# Patient Record
Sex: MALE | Race: WHITE | NOT HISPANIC OR LATINO | Employment: OTHER | URBAN - NONMETROPOLITAN AREA
[De-identification: names, ages, dates, MRNs, and addresses within clinical notes are randomized per-mention and may not be internally consistent; named-entity substitution may affect disease eponyms.]

---

## 2017-08-02 ENCOUNTER — HOSPITAL ENCOUNTER (EMERGENCY)
Facility: HOSPITAL | Age: 37
Discharge: HOME | End: 2017-08-02
Attending: EMERGENCY MEDICINE | Admitting: EMERGENCY MEDICINE

## 2017-08-02 VITALS
BODY MASS INDEX: 25.73 KG/M2 | SYSTOLIC BLOOD PRESSURE: 123 MMHG | TEMPERATURE: 99.6 F | DIASTOLIC BLOOD PRESSURE: 77 MMHG | WEIGHT: 190 LBS | OXYGEN SATURATION: 94 % | RESPIRATION RATE: 18 BRPM | HEIGHT: 72 IN | HEART RATE: 73 BPM

## 2017-08-02 DIAGNOSIS — T50.911A: Primary | ICD-10-CM

## 2017-08-02 LAB
ALBUMIN SERPL-MCNC: 4.5 G/DL (ref 3.5–5)
ALBUMIN/GLOB SERPL: 1.1 G/DL (ref 1–2)
ALP SERPL-CCNC: 58 U/L (ref 38–126)
ALT SERPL W P-5'-P-CCNC: 54 U/L (ref 13–69)
AMPHET+METHAMPHET UR QL: POSITIVE
AMPHETAMINES UR QL: POSITIVE
ANION GAP SERPL CALCULATED.3IONS-SCNC: 16 MMOL/L
AST SERPL-CCNC: 44 U/L (ref 15–46)
BARBITURATES UR QL SCN: NEGATIVE
BASOPHILS # BLD AUTO: 0.06 10*3/MM3 (ref 0–0.2)
BASOPHILS NFR BLD AUTO: 0.6 % (ref 0–2.5)
BENZODIAZ UR QL SCN: NEGATIVE
BILIRUB SERPL-MCNC: 1 MG/DL (ref 0.2–1.3)
BUN BLD-MCNC: 11 MG/DL (ref 7–20)
BUN/CREAT SERPL: 9.2 (ref 6.3–21.9)
BUPRENORPHINE SERPL-MCNC: NEGATIVE NG/ML
CALCIUM SPEC-SCNC: 9.1 MG/DL (ref 8.4–10.2)
CANNABINOIDS SERPL QL: POSITIVE
CHLORIDE SERPL-SCNC: 103 MMOL/L (ref 98–107)
CO2 SERPL-SCNC: 26 MMOL/L (ref 26–30)
COCAINE UR QL: NEGATIVE
CREAT BLD-MCNC: 1.2 MG/DL (ref 0.6–1.3)
DEPRECATED RDW RBC AUTO: 46.8 FL (ref 37–54)
EOSINOPHIL # BLD AUTO: 0.24 10*3/MM3 (ref 0–0.7)
EOSINOPHIL NFR BLD AUTO: 2.5 % (ref 0–7)
ERYTHROCYTE [DISTWIDTH] IN BLOOD BY AUTOMATED COUNT: 13.8 % (ref 11.5–14.5)
GFR SERPL CREATININE-BSD FRML MDRD: 68 ML/MIN/1.73
GLOBULIN UR ELPH-MCNC: 4 GM/DL
GLUCOSE BLD-MCNC: 111 MG/DL (ref 74–98)
HCT VFR BLD AUTO: 47.9 % (ref 42–52)
HGB BLD-MCNC: 16.5 G/DL (ref 14–18)
HOLD SPECIMEN: NORMAL
HOLD SPECIMEN: NORMAL
IMM GRANULOCYTES # BLD: 0.09 10*3/MM3 (ref 0–0.06)
IMM GRANULOCYTES NFR BLD: 1 % (ref 0–0.6)
LYMPHOCYTES # BLD AUTO: 5.59 10*3/MM3 (ref 0.6–3.4)
LYMPHOCYTES NFR BLD AUTO: 59.2 % (ref 10–50)
MCH RBC QN AUTO: 32.1 PG (ref 27–31)
MCHC RBC AUTO-ENTMCNC: 34.4 G/DL (ref 30–37)
MCV RBC AUTO: 93.2 FL (ref 80–94)
METHADONE UR QL SCN: NEGATIVE
MONOCYTES # BLD AUTO: 0.77 10*3/MM3 (ref 0–0.9)
MONOCYTES NFR BLD AUTO: 8.2 % (ref 0–12)
NEUTROPHILS # BLD AUTO: 2.69 10*3/MM3 (ref 2–6.9)
NEUTROPHILS NFR BLD AUTO: 28.5 % (ref 37–80)
NRBC BLD MANUAL-RTO: 0 /100 WBC (ref 0–0)
OPIATES UR QL: POSITIVE
OXYCODONE UR QL SCN: NEGATIVE
PCP UR QL SCN: NEGATIVE
PLATELET # BLD AUTO: 126 10*3/MM3 (ref 130–400)
PMV BLD AUTO: 10.5 FL (ref 6–12)
POTASSIUM BLD-SCNC: 4 MMOL/L (ref 3.5–5.1)
PROPOXYPH UR QL: NEGATIVE
PROT SERPL-MCNC: 8.5 G/DL (ref 6.3–8.2)
RBC # BLD AUTO: 5.14 10*6/MM3 (ref 4.7–6.1)
RBC MORPH BLD: NORMAL
SMALL PLATELETS BLD QL SMEAR: NORMAL
SODIUM BLD-SCNC: 141 MMOL/L (ref 137–145)
TRICYCLICS UR QL SCN: NEGATIVE
WBC MORPH BLD: NORMAL
WBC NRBC COR # BLD: 9.44 10*3/MM3 (ref 4.8–10.8)
WHOLE BLOOD HOLD SPECIMEN: NORMAL
WHOLE BLOOD HOLD SPECIMEN: NORMAL

## 2017-08-02 PROCEDURE — 80053 COMPREHEN METABOLIC PANEL: CPT | Performed by: NURSE PRACTITIONER

## 2017-08-02 PROCEDURE — 96360 HYDRATION IV INFUSION INIT: CPT

## 2017-08-02 PROCEDURE — 85025 COMPLETE CBC W/AUTO DIFF WBC: CPT | Performed by: NURSE PRACTITIONER

## 2017-08-02 PROCEDURE — 80306 DRUG TEST PRSMV INSTRMNT: CPT | Performed by: NURSE PRACTITIONER

## 2017-08-02 PROCEDURE — 93005 ELECTROCARDIOGRAM TRACING: CPT | Performed by: EMERGENCY MEDICINE

## 2017-08-02 PROCEDURE — 99284 EMERGENCY DEPT VISIT MOD MDM: CPT

## 2017-08-02 PROCEDURE — 85007 BL SMEAR W/DIFF WBC COUNT: CPT | Performed by: NURSE PRACTITIONER

## 2017-08-02 RX ORDER — SODIUM CHLORIDE 0.9 % (FLUSH) 0.9 %
10 SYRINGE (ML) INJECTION AS NEEDED
Status: DISCONTINUED | OUTPATIENT
Start: 2017-08-02 | End: 2017-08-02 | Stop reason: HOSPADM

## 2017-08-02 RX ADMIN — SODIUM CHLORIDE 1000 ML: 9 INJECTION, SOLUTION INTRAVENOUS at 15:00
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
## 2023-07-29 ENCOUNTER — APPOINTMENT (OUTPATIENT)
Dept: GENERAL RADIOLOGY | Facility: HOSPITAL | Age: 43
End: 2023-07-29
Payer: COMMERCIAL

## 2023-07-29 ENCOUNTER — HOSPITAL ENCOUNTER (EMERGENCY)
Facility: HOSPITAL | Age: 43
Discharge: OTHER INSTITUTION | End: 2023-07-29
Attending: EMERGENCY MEDICINE
Payer: COMMERCIAL

## 2023-07-29 ENCOUNTER — APPOINTMENT (OUTPATIENT)
Dept: CT IMAGING | Facility: HOSPITAL | Age: 43
End: 2023-07-29
Payer: COMMERCIAL

## 2023-07-29 VITALS
RESPIRATION RATE: 18 BRPM | SYSTOLIC BLOOD PRESSURE: 107 MMHG | DIASTOLIC BLOOD PRESSURE: 68 MMHG | TEMPERATURE: 98.8 F | BODY MASS INDEX: 29.12 KG/M2 | HEART RATE: 80 BPM | OXYGEN SATURATION: 99 % | HEIGHT: 72 IN | WEIGHT: 215 LBS

## 2023-07-29 DIAGNOSIS — J81.0 ACUTE PULMONARY EDEMA: ICD-10-CM

## 2023-07-29 DIAGNOSIS — E87.20 LACTIC ACIDOSIS: ICD-10-CM

## 2023-07-29 DIAGNOSIS — I85.01 BLEEDING ESOPHAGEAL VARICES, UNSPECIFIED ESOPHAGEAL VARICES TYPE: ICD-10-CM

## 2023-07-29 DIAGNOSIS — R60.9 PERIPHERAL EDEMA: ICD-10-CM

## 2023-07-29 DIAGNOSIS — D64.9 ANEMIA, UNSPECIFIED TYPE: Primary | ICD-10-CM

## 2023-07-29 DIAGNOSIS — F19.10 IV DRUG ABUSE: ICD-10-CM

## 2023-07-29 LAB
ABO GROUP BLD: NORMAL
ABO GROUP BLD: NORMAL
ALBUMIN SERPL-MCNC: 3.7 G/DL (ref 3.5–5.2)
ALBUMIN/GLOB SERPL: 1.2 G/DL
ALP SERPL-CCNC: 56 U/L (ref 39–117)
ALT SERPL W P-5'-P-CCNC: 42 U/L (ref 1–41)
ANION GAP SERPL CALCULATED.3IONS-SCNC: 13.5 MMOL/L (ref 5–15)
APTT PPP: 29.3 SECONDS (ref 70–100)
AST SERPL-CCNC: 40 U/L (ref 1–40)
BASOPHILS # BLD AUTO: 0.01 10*3/MM3 (ref 0–0.2)
BASOPHILS NFR BLD AUTO: 0.2 % (ref 0–1.5)
BILIRUB SERPL-MCNC: 0.3 MG/DL (ref 0–1.2)
BILIRUB UR QL STRIP: NEGATIVE
BLD GP AB SCN SERPL QL: NEGATIVE
BUN SERPL-MCNC: 15 MG/DL (ref 6–20)
BUN/CREAT SERPL: 14.3 (ref 7–25)
CALCIUM SPEC-SCNC: 8.6 MG/DL (ref 8.6–10.5)
CHLORIDE SERPL-SCNC: 103 MMOL/L (ref 98–107)
CLARITY UR: CLEAR
CO2 SERPL-SCNC: 20.5 MMOL/L (ref 22–29)
COLOR UR: YELLOW
CREAT SERPL-MCNC: 1.05 MG/DL (ref 0.76–1.27)
D-LACTATE SERPL-SCNC: 2.9 MMOL/L (ref 0.5–2)
DEPRECATED RDW RBC AUTO: 51.7 FL (ref 37–54)
EGFRCR SERPLBLD CKD-EPI 2021: 90.3 ML/MIN/1.73
EOSINOPHIL # BLD AUTO: 0.08 10*3/MM3 (ref 0–0.4)
EOSINOPHIL NFR BLD AUTO: 1.6 % (ref 0.3–6.2)
ERYTHROCYTE [DISTWIDTH] IN BLOOD BY AUTOMATED COUNT: 17.4 % (ref 12.3–15.4)
GEN 5 2HR TROPONIN T REFLEX: 19 NG/L
GLOBULIN UR ELPH-MCNC: 3.1 GM/DL
GLUCOSE SERPL-MCNC: 69 MG/DL (ref 65–99)
GLUCOSE UR STRIP-MCNC: NEGATIVE MG/DL
HCT VFR BLD AUTO: 15.1 % (ref 37.5–51)
HGB BLD-MCNC: 4.3 G/DL (ref 13–17.7)
HGB UR QL STRIP.AUTO: NEGATIVE
HOLD SPECIMEN: NORMAL
HOLD SPECIMEN: NORMAL
HYPOCHROMIA BLD QL: NORMAL
IMM GRANULOCYTES # BLD AUTO: 0.04 10*3/MM3 (ref 0–0.05)
IMM GRANULOCYTES NFR BLD AUTO: 0.8 % (ref 0–0.5)
INR PPP: 1.11 (ref 0.9–1.1)
KETONES UR QL STRIP: NEGATIVE
LEUKOCYTE ESTERASE UR QL STRIP.AUTO: NEGATIVE
LYMPHOCYTES # BLD AUTO: 1.56 10*3/MM3 (ref 0.7–3.1)
LYMPHOCYTES NFR BLD AUTO: 31.3 % (ref 19.6–45.3)
MACROCYTES BLD QL SMEAR: NORMAL
MCH RBC QN AUTO: 23.2 PG (ref 26.6–33)
MCHC RBC AUTO-ENTMCNC: 28.5 G/DL (ref 31.5–35.7)
MCV RBC AUTO: 81.6 FL (ref 79–97)
MONOCYTES # BLD AUTO: 0.6 10*3/MM3 (ref 0.1–0.9)
MONOCYTES NFR BLD AUTO: 12 % (ref 5–12)
NEUTROPHILS NFR BLD AUTO: 2.69 10*3/MM3 (ref 1.7–7)
NEUTROPHILS NFR BLD AUTO: 54.1 % (ref 42.7–76)
NITRITE UR QL STRIP: NEGATIVE
NRBC BLD AUTO-RTO: 0.4 /100 WBC (ref 0–0.2)
NT-PROBNP SERPL-MCNC: 249.1 PG/ML (ref 0–450)
PH UR STRIP.AUTO: 7 [PH] (ref 5–8)
PLAT MORPH BLD: NORMAL
PLATELET # BLD AUTO: 135 10*3/MM3 (ref 140–450)
PMV BLD AUTO: 12 FL (ref 6–12)
POTASSIUM SERPL-SCNC: 4.7 MMOL/L (ref 3.5–5.2)
PROCALCITONIN SERPL-MCNC: 0.09 NG/ML (ref 0–0.25)
PROT SERPL-MCNC: 6.8 G/DL (ref 6–8.5)
PROT UR QL STRIP: NEGATIVE
PROTHROMBIN TIME: 14.8 SECONDS (ref 12.3–15.1)
RBC # BLD AUTO: 1.85 10*6/MM3 (ref 4.14–5.8)
RH BLD: POSITIVE
RH BLD: POSITIVE
SODIUM SERPL-SCNC: 137 MMOL/L (ref 136–145)
SP GR UR STRIP: 1.02 (ref 1–1.03)
T&S EXPIRATION DATE: NORMAL
TARGETS BLD QL SMEAR: NORMAL
TROPONIN T DELTA: 1 NG/L
TROPONIN T SERPL HS-MCNC: 18 NG/L
UROBILINOGEN UR QL STRIP: NORMAL
WBC MORPH BLD: NORMAL
WBC NRBC COR # BLD: 4.98 10*3/MM3 (ref 3.4–10.8)
WHOLE BLOOD HOLD COAG: NORMAL
WHOLE BLOOD HOLD SPECIMEN: NORMAL

## 2023-07-29 PROCEDURE — 87040 BLOOD CULTURE FOR BACTERIA: CPT | Performed by: EMERGENCY MEDICINE

## 2023-07-29 PROCEDURE — 81003 URINALYSIS AUTO W/O SCOPE: CPT | Performed by: EMERGENCY MEDICINE

## 2023-07-29 PROCEDURE — 86900 BLOOD TYPING SEROLOGIC ABO: CPT

## 2023-07-29 PROCEDURE — 85007 BL SMEAR W/DIFF WBC COUNT: CPT | Performed by: EMERGENCY MEDICINE

## 2023-07-29 PROCEDURE — 84145 PROCALCITONIN (PCT): CPT | Performed by: EMERGENCY MEDICINE

## 2023-07-29 PROCEDURE — 74177 CT ABD & PELVIS W/CONTRAST: CPT

## 2023-07-29 PROCEDURE — P9016 RBC LEUKOCYTES REDUCED: HCPCS

## 2023-07-29 PROCEDURE — 99285 EMERGENCY DEPT VISIT HI MDM: CPT

## 2023-07-29 PROCEDURE — 84484 ASSAY OF TROPONIN QUANT: CPT | Performed by: EMERGENCY MEDICINE

## 2023-07-29 PROCEDURE — 86920 COMPATIBILITY TEST SPIN: CPT

## 2023-07-29 PROCEDURE — 85025 COMPLETE CBC W/AUTO DIFF WBC: CPT | Performed by: EMERGENCY MEDICINE

## 2023-07-29 PROCEDURE — 96376 TX/PRO/DX INJ SAME DRUG ADON: CPT

## 2023-07-29 PROCEDURE — 85610 PROTHROMBIN TIME: CPT | Performed by: EMERGENCY MEDICINE

## 2023-07-29 PROCEDURE — 36430 TRANSFUSION BLD/BLD COMPNT: CPT

## 2023-07-29 PROCEDURE — 83880 ASSAY OF NATRIURETIC PEPTIDE: CPT | Performed by: EMERGENCY MEDICINE

## 2023-07-29 PROCEDURE — 86901 BLOOD TYPING SEROLOGIC RH(D): CPT

## 2023-07-29 PROCEDURE — 96368 THER/DIAG CONCURRENT INF: CPT

## 2023-07-29 PROCEDURE — 96366 THER/PROPH/DIAG IV INF ADDON: CPT

## 2023-07-29 PROCEDURE — 71275 CT ANGIOGRAPHY CHEST: CPT

## 2023-07-29 PROCEDURE — 85730 THROMBOPLASTIN TIME PARTIAL: CPT | Performed by: EMERGENCY MEDICINE

## 2023-07-29 PROCEDURE — 83605 ASSAY OF LACTIC ACID: CPT | Performed by: EMERGENCY MEDICINE

## 2023-07-29 PROCEDURE — 25010000002 VANCOMYCIN 5 G RECONSTITUTED SOLUTION: Performed by: EMERGENCY MEDICINE

## 2023-07-29 PROCEDURE — 25510000001 IOPAMIDOL 61 % SOLUTION: Performed by: EMERGENCY MEDICINE

## 2023-07-29 PROCEDURE — 96365 THER/PROPH/DIAG IV INF INIT: CPT

## 2023-07-29 PROCEDURE — 80053 COMPREHEN METABOLIC PANEL: CPT | Performed by: EMERGENCY MEDICINE

## 2023-07-29 PROCEDURE — 25010000002 OCTREOTIDE PER 25 MCG: Performed by: EMERGENCY MEDICINE

## 2023-07-29 PROCEDURE — 71045 X-RAY EXAM CHEST 1 VIEW: CPT

## 2023-07-29 PROCEDURE — 86901 BLOOD TYPING SEROLOGIC RH(D): CPT | Performed by: EMERGENCY MEDICINE

## 2023-07-29 PROCEDURE — 36415 COLL VENOUS BLD VENIPUNCTURE: CPT

## 2023-07-29 PROCEDURE — 86850 RBC ANTIBODY SCREEN: CPT | Performed by: EMERGENCY MEDICINE

## 2023-07-29 PROCEDURE — 86900 BLOOD TYPING SEROLOGIC ABO: CPT | Performed by: EMERGENCY MEDICINE

## 2023-07-29 PROCEDURE — 25010000002 CEFTRIAXONE SODIUM-DEXTROSE 1000-3.74 MG-%(50ML) RECONSTITUTED SOLUTION: Performed by: EMERGENCY MEDICINE

## 2023-07-29 RX ORDER — ACETAMINOPHEN 500 MG
1000 TABLET ORAL EVERY 6 HOURS PRN
Status: DISCONTINUED | OUTPATIENT
Start: 2023-07-29 | End: 2023-07-29 | Stop reason: HOSPADM

## 2023-07-29 RX ORDER — ASPIRIN 325 MG
325 TABLET ORAL ONCE
Status: COMPLETED | OUTPATIENT
Start: 2023-07-29 | End: 2023-07-29

## 2023-07-29 RX ORDER — SODIUM CHLORIDE 0.9 % (FLUSH) 0.9 %
10 SYRINGE (ML) INJECTION AS NEEDED
Status: DISCONTINUED | OUTPATIENT
Start: 2023-07-29 | End: 2023-07-29 | Stop reason: HOSPADM

## 2023-07-29 RX ORDER — VANCOMYCIN 2 GRAM/500 ML IN 0.9 % SODIUM CHLORIDE INTRAVENOUS
20 ONCE
Status: COMPLETED | OUTPATIENT
Start: 2023-07-29 | End: 2023-07-29

## 2023-07-29 RX ORDER — CEFTRIAXONE 1 G/50ML
1000 INJECTION, SOLUTION INTRAVENOUS ONCE
Status: COMPLETED | OUTPATIENT
Start: 2023-07-29 | End: 2023-07-29

## 2023-07-29 RX ORDER — FUROSEMIDE 10 MG/ML
40 INJECTION INTRAMUSCULAR; INTRAVENOUS
Status: DISCONTINUED | OUTPATIENT
Start: 2023-07-29 | End: 2023-07-29 | Stop reason: HOSPADM

## 2023-07-29 RX ORDER — PANTOPRAZOLE SODIUM 40 MG/10ML
80 INJECTION, POWDER, LYOPHILIZED, FOR SOLUTION INTRAVENOUS ONCE
Status: COMPLETED | OUTPATIENT
Start: 2023-07-29 | End: 2023-07-29

## 2023-07-29 RX ORDER — OCTREOTIDE ACETATE 100 UG/ML
50 INJECTION, SOLUTION INTRAVENOUS; SUBCUTANEOUS ONCE
Status: COMPLETED | OUTPATIENT
Start: 2023-07-29 | End: 2023-07-29

## 2023-07-29 RX ADMIN — PANTOPRAZOLE SODIUM 8 MG/HR: 40 INJECTION, POWDER, FOR SOLUTION INTRAVENOUS at 12:06

## 2023-07-29 RX ADMIN — PANTOPRAZOLE SODIUM 80 MG: 40 INJECTION, POWDER, FOR SOLUTION INTRAVENOUS at 12:05

## 2023-07-29 RX ADMIN — IOPAMIDOL 100 ML: 612 INJECTION, SOLUTION INTRAVENOUS at 11:12

## 2023-07-29 RX ADMIN — OCTREOTIDE ACETATE 50 MCG: 100 INJECTION, SOLUTION INTRAVENOUS; SUBCUTANEOUS at 12:06

## 2023-07-29 RX ADMIN — ACETAMINOPHEN 1000 MG: 500 TABLET, FILM COATED ORAL at 13:39

## 2023-07-29 RX ADMIN — OCTREOTIDE ACETATE 25 MCG/HR: 200 INJECTION, SOLUTION INTRAVENOUS; SUBCUTANEOUS at 12:06

## 2023-07-29 RX ADMIN — VANCOMYCIN HYDROCHLORIDE 2000 MG: 500 INJECTION, POWDER, LYOPHILIZED, FOR SOLUTION INTRAVENOUS at 12:30

## 2023-07-29 RX ADMIN — ASPIRIN 325 MG: 325 TABLET ORAL at 10:16

## 2023-07-29 RX ADMIN — CEFTRIAXONE 1000 MG: 1 INJECTION, SOLUTION INTRAVENOUS at 12:05

## 2023-07-29 NOTE — ED NOTES
Called [facility] at this time requesting hospitalist per MD Casimiro request at this time. [facility] stated that they would have to plavce the pt on a wait list because they have no beds available at this time, but would have hospitalist call us back. Casimiro noticed.

## 2023-07-29 NOTE — ED NOTES
Called [facility] at this time per MD Casimiro request. [facility] stated they would call back. Request was made to Radiology to power share any imaging to [facility]. MD Casimiro notified.

## 2023-07-29 NOTE — ED NOTES
Called [facility] at this time requesting non emergent transport to [facility] ED.

## 2023-07-29 NOTE — ED PROVIDER NOTES
"  HPI: Nilson Miller is a 42 y.o. male who presents to the emergency department complaining of shortness of breath and swelling.  He states that for the last week or so he has had increasing swelling to his lower extremities and abdomen.  He is also felt increasingly short of breath.  His significant other has noticed that he \"pale and green\".  He has had some subjective fevers and chills.  Had 1 episode of diarrhea this morning.  He denies any severe abdominal pain, vomiting, chest pain.  Patient does endorse using IV methamphetamines.      REVIEW OF SYSTEMS: All other systems reviewed and are negative     PAST MEDICAL HISTORY:   History reviewed. No pertinent past medical history.     FAMILY HISTORY:   History reviewed. No pertinent family history.     SOCIAL HISTORY:   Social History     Socioeconomic History    Marital status: Legally    Tobacco Use    Smoking status: Every Day     Packs/day: 0.50     Types: Cigarettes   Substance and Sexual Activity    Alcohol use: Yes     Comment: \"occasionally\"    Drug use: Yes     Types: Methamphetamines, Marijuana        SURGICAL HISTORY:   Past Surgical History:   Procedure Laterality Date    KNEE RECONSTRUCTION, MEDIAL PATELLAR FEMORAL LIGAMENT          ALLERGIES: Penicillins and Sulfa antibiotics       PHYSICAL EXAM:   VITAL SIGNS:   Vitals:    07/29/23 1252   BP: 98/64   Pulse: 81   Resp: 18   Temp: 98.8 °F (37.1 °C)   SpO2: 98%      CONSTITUTIONAL: Awake, ill-appearing  HENT: Atraumatic, normocephalic, oral mucosa moist, airway patent. Nares patent without drainage. External ears normal.   EYES: EOMI, PERRL   NECK: Trachea midline, nontender, supple   CARDIOVASCULAR: Tachycardia, murmur heard  PULMONARY/CHEST: Normal work of breathing. Clear to auscultation, no rhonchi, wheezes, or rales.  ABDOMINAL: Soft, mild diffuse tenderness, mild distention, no rebound or guarding.  NEUROLOGIC: Nonfocal, moves all four extremities, no gross sensory or motor deficits. "   EXTREMITIES: Pitting edema to the thighs bilaterally  SKIN: Warm, Dry, pale, jaundiced      ED COURSE / MEDICAL DECISION MAKING:     Nilson Miller is a 42 y.o. male who presents to the emergency department for evaluation of shortness of breath, swelling.  Ill-appearing young man with exam as above.  His vital signs are notable for tachycardia.  His oxygen saturation is normal on room air when they are present.  His exam is notable for new heart murmur, pitting edema to the bilateral lower extremities and pale possibly jaundiced skin.  We will start with labs and imaging.  Disposition pending though anticipate admission.    Differential diagnosis includes endocarditis, CHF, pneumonia, liver failure, anemia, UTI among other etiologies.    EKG interpreted by me: Sinus rhythm, normal rate, no acute ST/T changes, this is a normal EKG    1209  Initial labs notable for marked anemia, mild lactic acidosis.  BNP is normal.  Imaging reveals some mild pulmonary edema, distal esophageal thickening and varices, cirrhosis with portal hypertension.  Also small amount of ascites.  Patient was ordered transfusion, Protonix, octreotide, Rocephin and vancomycin.  We have no GI coverage here.  Discussed with Dr. Vitale at [facility], patient placed on wait list.  Remains hemodynamically stable.    1255  Discussed with Dr. Roman at the  transfer center, patient has been accepted to the [facility] ER.  Discussed with patient and significant other, they are agreeable.    45 minutes of critical care provided. This time excludes other billable procedures. Time does include preparation of documents, medical consultations, review of old records, and direct bedside care. Patient is at high risk for life-threatening deterioration due to severe anemia, lactic acidosis, pulmonary edema.       Final diagnoses:   Anemia, unspecified type   Bleeding esophageal varices, unspecified esophageal varices type   Lactic acidosis    IV drug abuse   Acute pulmonary edema   Peripheral edema        Danielito Kirkpatrick MD  07/29/23 6988

## 2023-07-30 LAB
BH BB BLOOD EXPIRATION DATE: NORMAL
BH BB BLOOD TYPE BARCODE: 6200
BH BB DISPENSE STATUS: NORMAL
BH BB PRODUCT CODE: NORMAL
BH BB UNIT NUMBER: NORMAL
CROSSMATCH INTERPRETATION: NORMAL
UNIT  ABO: NORMAL
UNIT  RH: NORMAL

## 2023-08-03 LAB
BACTERIA SPEC AEROBE CULT: NORMAL
BACTERIA SPEC AEROBE CULT: NORMAL
BH BB BLOOD EXPIRATION DATE: NORMAL
BH BB BLOOD TYPE BARCODE: 6200
BH BB DISPENSE STATUS: NORMAL
BH BB PRODUCT CODE: NORMAL
BH BB UNIT NUMBER: NORMAL
CROSSMATCH INTERPRETATION: NORMAL
UNIT  ABO: NORMAL
UNIT  RH: NORMAL

## 2023-08-14 ENCOUNTER — TRANSCRIBE ORDERS (OUTPATIENT)
Dept: ADMINISTRATIVE | Facility: HOSPITAL | Age: 43
End: 2023-08-14
Payer: COMMERCIAL

## 2023-08-14 DIAGNOSIS — I89.0 LYMPHEDEMA: Primary | ICD-10-CM

## 2023-09-18 ENCOUNTER — HOSPITAL ENCOUNTER (OUTPATIENT)
Dept: CT IMAGING | Facility: HOSPITAL | Age: 43
Discharge: HOME | End: 2023-09-18
Payer: COMMERCIAL

## 2023-09-18 DIAGNOSIS — I89.0 LYMPHEDEMA: ICD-10-CM

## 2023-12-07 ENCOUNTER — TRANSCRIBE ORDERS (OUTPATIENT)
Dept: LAB | Facility: HOSPITAL | Age: 43
End: 2023-12-07
Payer: COMMERCIAL

## 2023-12-07 DIAGNOSIS — B18.2 CHRONIC HEPATITIS C WITHOUT HEPATIC COMA: ICD-10-CM

## 2023-12-11 ENCOUNTER — LAB (OUTPATIENT)
Dept: LAB | Facility: HOSPITAL | Age: 43
End: 2023-12-11
Payer: COMMERCIAL

## 2023-12-11 ENCOUNTER — OFFICE VISIT (OUTPATIENT)
Dept: PSYCHIATRY | Facility: CLINIC | Age: 43
End: 2023-12-11
Payer: COMMERCIAL

## 2023-12-11 VITALS
HEART RATE: 78 BPM | OXYGEN SATURATION: 98 % | WEIGHT: 213 LBS | DIASTOLIC BLOOD PRESSURE: 92 MMHG | SYSTOLIC BLOOD PRESSURE: 160 MMHG | HEIGHT: 72 IN | BODY MASS INDEX: 28.85 KG/M2

## 2023-12-11 DIAGNOSIS — F15.20 METHAMPHETAMINE USE DISORDER, SEVERE, DEPENDENCE: Primary | ICD-10-CM

## 2023-12-11 DIAGNOSIS — F10.21 ALCOHOL USE DISORDER, SEVERE, IN SUSTAINED REMISSION: ICD-10-CM

## 2023-12-11 DIAGNOSIS — F15.20 METHAMPHETAMINE USE DISORDER, SEVERE, DEPENDENCE: ICD-10-CM

## 2023-12-11 DIAGNOSIS — B18.2 CHRONIC HEPATITIS C WITHOUT HEPATIC COMA: ICD-10-CM

## 2023-12-11 DIAGNOSIS — F11.21 OPIOID USE DISORDER, SEVERE, IN SUSTAINED REMISSION: ICD-10-CM

## 2023-12-11 DIAGNOSIS — F12.20 CANNABIS USE DISORDER, SEVERE, DEPENDENCE: ICD-10-CM

## 2023-12-11 LAB
ALBUMIN SERPL-MCNC: 4.5 G/DL (ref 3.5–5.2)
ALBUMIN/GLOB SERPL: 1.4 G/DL
ALP SERPL-CCNC: 59 U/L (ref 39–117)
ALT SERPL W P-5'-P-CCNC: 34 U/L (ref 1–41)
ANION GAP SERPL CALCULATED.3IONS-SCNC: 15 MMOL/L (ref 5–15)
AST SERPL-CCNC: 34 U/L (ref 1–40)
BASOPHILS # BLD AUTO: 0.03 10*3/MM3 (ref 0–0.2)
BASOPHILS NFR BLD AUTO: 0.8 % (ref 0–1.5)
BILIRUB SERPL-MCNC: 0.3 MG/DL (ref 0–1.2)
BUN SERPL-MCNC: 11 MG/DL (ref 6–20)
BUN/CREAT SERPL: 10.6 (ref 7–25)
CALCIUM SPEC-SCNC: 9.1 MG/DL (ref 8.6–10.5)
CHLORIDE SERPL-SCNC: 100 MMOL/L (ref 98–107)
CO2 SERPL-SCNC: 25 MMOL/L (ref 22–29)
CREAT SERPL-MCNC: 1.04 MG/DL (ref 0.76–1.27)
DEPRECATED RDW RBC AUTO: 52.9 FL (ref 37–54)
EGFRCR SERPLBLD CKD-EPI 2021: 90.8 ML/MIN/1.73
EOSINOPHIL # BLD AUTO: 0.16 10*3/MM3 (ref 0–0.4)
EOSINOPHIL NFR BLD AUTO: 4.3 % (ref 0.3–6.2)
ERYTHROCYTE [DISTWIDTH] IN BLOOD BY AUTOMATED COUNT: 19.4 % (ref 12.3–15.4)
GLOBULIN UR ELPH-MCNC: 3.3 GM/DL
GLUCOSE SERPL-MCNC: 115 MG/DL (ref 65–99)
HCT VFR BLD AUTO: 36.4 % (ref 37.5–51)
HGB BLD-MCNC: 11.4 G/DL (ref 13–17.7)
INR PPP: 0.96 (ref 0.9–1.1)
LYMPHOCYTES # BLD AUTO: 1.36 10*3/MM3 (ref 0.7–3.1)
LYMPHOCYTES NFR BLD AUTO: 36.5 % (ref 19.6–45.3)
MCH RBC QN AUTO: 24.7 PG (ref 26.6–33)
MCHC RBC AUTO-ENTMCNC: 31.3 G/DL (ref 31.5–35.7)
MCV RBC AUTO: 78.8 FL (ref 79–97)
MONOCYTES # BLD AUTO: 0.39 10*3/MM3 (ref 0.1–0.9)
MONOCYTES NFR BLD AUTO: 10.5 % (ref 5–12)
NEUTROPHILS NFR BLD AUTO: 1.79 10*3/MM3 (ref 1.7–7)
NEUTROPHILS NFR BLD AUTO: 47.9 % (ref 42.7–76)
PLATELET # BLD AUTO: 76 10*3/MM3 (ref 140–450)
POTASSIUM SERPL-SCNC: 4 MMOL/L (ref 3.5–5.2)
PROT SERPL-MCNC: 7.8 G/DL (ref 6–8.5)
PROTHROMBIN TIME: 13.3 SECONDS (ref 12.3–15.1)
RBC # BLD AUTO: 4.62 10*6/MM3 (ref 4.14–5.8)
SODIUM SERPL-SCNC: 140 MMOL/L (ref 136–145)
WBC NRBC COR # BLD AUTO: 3.73 10*3/MM3 (ref 3.4–10.8)

## 2023-12-11 PROCEDURE — 1160F RVW MEDS BY RX/DR IN RCRD: CPT | Performed by: NURSE PRACTITIONER

## 2023-12-11 PROCEDURE — 85025 COMPLETE CBC W/AUTO DIFF WBC: CPT

## 2023-12-11 PROCEDURE — 90792 PSYCH DIAG EVAL W/MED SRVCS: CPT | Performed by: NURSE PRACTITIONER

## 2023-12-11 PROCEDURE — 85610 PROTHROMBIN TIME: CPT

## 2023-12-11 PROCEDURE — 80053 COMPREHEN METABOLIC PANEL: CPT

## 2023-12-11 PROCEDURE — 36415 COLL VENOUS BLD VENIPUNCTURE: CPT

## 2023-12-11 PROCEDURE — 87522 HEPATITIS C REVRS TRNSCRPJ: CPT

## 2023-12-11 PROCEDURE — 1159F MED LIST DOCD IN RCRD: CPT | Performed by: NURSE PRACTITIONER

## 2023-12-11 RX ORDER — FUROSEMIDE 20 MG/1
20 TABLET ORAL DAILY
COMMUNITY
Start: 2023-08-10

## 2023-12-11 RX ORDER — PANTOPRAZOLE SODIUM 40 MG/1
40 TABLET, DELAYED RELEASE ORAL DAILY
COMMUNITY
Start: 2023-08-03 | End: 2024-08-02

## 2023-12-11 RX ORDER — OMEPRAZOLE 20 MG/1
20 CAPSULE, DELAYED RELEASE ORAL DAILY
COMMUNITY
Start: 2023-11-08

## 2023-12-11 RX ORDER — SPIRONOLACTONE 50 MG/1
50 TABLET, FILM COATED ORAL DAILY
COMMUNITY
Start: 2023-08-10

## 2023-12-11 RX ORDER — FERROUS SULFATE 324(65)MG
324 TABLET, DELAYED RELEASE (ENTERIC COATED) ORAL
COMMUNITY
Start: 2023-08-03

## 2023-12-11 RX ORDER — VELPATASVIR AND SOFOSBUVIR 100; 400 MG/1; MG/1
1 TABLET, FILM COATED ORAL DAILY
COMMUNITY
Start: 2023-11-03

## 2023-12-11 RX ORDER — LANOLIN ALCOHOL/MO/W.PET/CERES
CREAM (GRAM) TOPICAL
COMMUNITY

## 2023-12-11 NOTE — PROGRESS NOTES
New Patient Office Visit        Patient Name: Nilson Miller  : 1980   MRN: [id number]     Referring Provider: Kylah Saenz APRN    Chief Complaint: Substance use    History of Present Illness:   Nilson Miller is a 43 y.o. male who is here today for initial evaluation with provider related to substance use. Initial substance at age 14 with marijuana use.  Use was social a onset but increased over time.  At peak was smoking 4-5 joints per day in his 20s.  Currently smoking about 1 joint nightly with last intake 2023.  At age 16 use of alcohol began and used regularly for quite some time. Peak was about 20 beers per day with last use 10 years ago.  Around age 23 use of opioids and methamphetamines began.  Use of both escalated over time.  At peak was using multiple Percocet 30s daily.  Around age [age] transition to Suboxone and had no further Percocet intake.  Began using heroin briefly in 2019.  Use was sporadic but resulted in 5 or 6 overdoses.  No intake since 2019.  Methamphetamine use was regular for 3 to 4 years.  Got a job at the [employer] and stopped for a few years.  Restarted in  and has been using about 1/4 g daily.  Last intake 2023.    Cravings for marijuana and methamphetamine are intermittent and typically triggered by fatigue, habit. Previous [facility] treatment includes SAP while incarcerated.  Identifies sober support system of his aunt and girlfriend. Motivated to change for his health and new baby.     Denies psych history. Today reports symptoms of situational anxiety but feels he is coping well overall.  No previous pharmacological intervention.      PMH includes hepatitis C, cirrhosis, and esophageal varices.  Currently being treated for hep C by .  Has upcoming appointment with [facility] for banding.  Currently does not have a PCP.  Denies seizure history.    Currently lives in [place] with his aunt.  Aunt and girlfriend are sober and supportive.   Has 3 children (ages 23, 19, 1 month) . Currently employed full-time by [employer]. License is not active and and helps with transportation. Current legal issues include possession of methamphetamine and marijuana from 11/29/2023.  Multiple previous felonies.  Charges are out of [facility].  is [place]. Next court date is 1/3/2024.    Triggers: Fatigue, habit, seeing needles in the past    Cravings: Intermittent    Relapse Prevention: IOP screener scheduled, TCM, peer support, recovery meetings encouraged    Urine Drug Screen (today's visit) discussed: Ordered    UDS Confirmation: N/A    MAXWELL (PDMP) Reviewed for Current/Active Medications: No active medications    DSM 5 Substance Use Disorder Checklist     Diagnostic Criteria   (Substance use disorder requires at least 2 criteria be met within 12 month period)   Meets Criteria          Yes / No  Notes/Supporting Information    Substance often taken in larger amounts or over a longer period than intended.  yes Alcohol, marijuana, methamphetamine, opioids   2.  There is a persistent desire or unsuccessful effort to cut        down or control th substance use.  yes marijuana, methamphetamine, opioids   3.  A great deal of time is spent in activities necessary to        obtain the substance, use the substance, or recover        from its effects.  yes marijuana, methamphetamine, opioids   4.  Craving or a strong desire to use the substance.  yes Alcohol, marijuana, methamphetamine, opioids   5.  Recurrent substance use resulting in failure to fulfill        major role obligations at work, school, or home.  no    6.  Continued substance use despite having persistent or         recurrent social or interpersonal problems caused or         exacerbated by the effects of the substance.  yes Alcohol, opioids   7.   Important social, occupational or recreational activities        are given up or reduced because of substance use.  yes Alcohol,  marijuana, methamphetamine, opioids   8.   Recurrent substance use in situations in which it is        physically hazardous.  yes Alcohol, methamphetamine, opioids   9.  Continued use despite knowledge of having a         persistent or recurrent physical or psychological         problem that is likely to have been caused or        exacerbated by the substance.  yes Alcohol, methamphetamine, opioids   10. * Tolerance, as defined by either of the following:          a. A need for markedly increased amounts of the              Substance to achieve intoxication or desired effect.          b. Markedly diminished effect with continued use of              The same substance.  yes Alcohol, marijuana, methamphetamine, opioids   11.  * Withdrawal, as manifested by either of the following:         a. The characteristic withdrawal for the substance.          b. The same (or closely related) substance is taken to               Relieve or avoid withdrawal symptoms.  yes Alcohol, opioids   **This criterion is not considered to be met for those individuals taking prescriptions opiates solely under medical supervision. **   Severity: Mild: 2-3 symptoms, Moderate: 4-5 symptoms, Severe: 6 or more symptoms.      Alcohol 9 of 11, marijuana 6 of 11, methamphetamine 8 of 11, opioids 10 of 11    Past Surgical History:  Past Surgical History:   Procedure Laterality Date    KNEE RECONSTRUCTION, MEDIAL PATELLAR FEMORAL LIGAMENT         Problem List:  There is no problem list on file for this patient.      Allergy:   Allergies   Allergen Reactions    Penicillins Hives    Sulfa Antibiotics Hives        Current Medications:   Current Outpatient Medications   Medication Sig Dispense Refill    ferrous sulfate 324 (65 Fe) MG tablet delayed-release EC tablet Take 1 tablet by mouth Every Other Day.      furosemide (LASIX) 20 MG tablet Take 1 tablet by mouth Daily.      Magnesium Oxide -Mg Supplement 400 (240 Mg) MG tablet TAKE ONE TABLET BY MOUTH 2  "TIMES DAILY      omeprazole (priLOSEC) 20 MG capsule Take 1 capsule by mouth Daily.      pantoprazole (PROTONIX) 40 MG EC tablet Take 1 tablet by mouth Daily.      Sofosbuvir-Velpatasvir 400-100 MG tablet Take 1 tablet by mouth Daily.      spironolactone (ALDACTONE) 50 MG tablet Take 1 tablet by mouth Daily.       No current facility-administered medications for this visit.       Past Medical History:  History reviewed. No pertinent past medical history.    Social History:  Social History     Socioeconomic History    Marital status: Legally    Tobacco Use    Smoking status: Every Day     Packs/day: .25     Types: Cigarettes     Passive exposure: Current    Smokeless tobacco: Never   Vaping Use    Vaping Use: Never used   Substance and Sexual Activity    Alcohol use: Not Currently     Comment: \"occasionally\"    Drug use: Yes     Types: Methamphetamines, Marijuana    Sexual activity: Defer       Family History:  History reviewed. No pertinent family history.      Subjective      Review of Systems:   Review of Systems   Constitutional:  Positive for fatigue. Negative for chills and fever.   Respiratory:  Negative for shortness of breath.    Cardiovascular:  Negative for chest pain.   Gastrointestinal:  Negative for abdominal pain.   Skin:  Negative for skin lesions.   Neurological:  Negative for seizures and confusion.   Psychiatric/Behavioral:  Positive for stress. Negative for hallucinations, sleep disturbance, suicidal ideas and depressed mood. The patient is not nervous/anxious.        PHQ-9 Depression Screening  Little interest or pleasure in doing things? 0-->not at all   Feeling down, depressed, or hopeless? 0-->not at all   Trouble falling or staying asleep, or sleeping too much? 0-->not at all   Feeling tired or having little energy? 0-->not at all   Poor appetite or overeating? 0-->not at all   Feeling bad about yourself - or that you are a failure or have let yourself or your family down? 0-->not at " "all   Trouble concentrating on things, such as reading the newspaper or watching television? 0-->not at all   Moving or speaking so slowly that other people could have noticed? Or the opposite - being so fidgety or restless that you have been moving around a lot more than usual? 0-->not at all   Thoughts that you would be better off dead, or of hurting yourself in some way? 0-->not at all   PHQ-9 Total Score 0   If you checked off any problems, how difficult have these problems made it for you to do your work, take care of things at home, or get along with other people? not difficult at all       RIN-7 Score:   Feeling nervous, anxious or on edge: Not at all  Not being able to stop or control worrying: Not at all  Worrying too much about different things: Not at all  Trouble Relaxing: Not at all  Being so restless that it is hard to sit still: Not at all  Feeling afraid as if something awful might happen: Not at all  Becoming easily annoyed or irritable: Not at all  RIN 7 Total Score: 0  If you checked any problems, how difficult have these problems made it for you to do your work, take care of things at home, or get along with other people: Not difficult at all    Patient History:   The following portions of the patient's history were reviewed and updated as appropriate: allergies, current medications, past family history, past medical history, past social history, past surgical history and problem list.     Social:  Social History     Socioeconomic History    Marital status: Legally    Tobacco Use    Smoking status: Every Day     Packs/day: .25     Types: Cigarettes     Passive exposure: Current    Smokeless tobacco: Never   Vaping Use    Vaping Use: Never used   Substance and Sexual Activity    Alcohol use: Not Currently     Comment: \"occasionally\"    Drug use: Yes     Types: Methamphetamines, Marijuana    Sexual activity: Defer       Medications:     Current Outpatient Medications:     ferrous sulfate " "324 (65 Fe) MG tablet delayed-release EC tablet, Take 1 tablet by mouth Every Other Day., Disp: , Rfl:     furosemide (LASIX) 20 MG tablet, Take 1 tablet by mouth Daily., Disp: , Rfl:     Magnesium Oxide -Mg Supplement 400 (240 Mg) MG tablet, TAKE ONE TABLET BY MOUTH 2 TIMES DAILY, Disp: , Rfl:     omeprazole (priLOSEC) 20 MG capsule, Take 1 capsule by mouth Daily., Disp: , Rfl:     pantoprazole (PROTONIX) 40 MG EC tablet, Take 1 tablet by mouth Daily., Disp: , Rfl:     Sofosbuvir-Velpatasvir 400-100 MG tablet, Take 1 tablet by mouth Daily., Disp: , Rfl:     spironolactone (ALDACTONE) 50 MG tablet, Take 1 tablet by mouth Daily., Disp: , Rfl:     Objective     Physical Exam  Vitals reviewed.   Constitutional:       General: He is not in acute distress.     Appearance: He is well-developed. He is not ill-appearing.   Pulmonary:      Effort: No respiratory distress.   Neurological:      Mental Status: He is alert and oriented to person, place, and time.      Gait: Gait normal.   Psychiatric:         Attention and Perception: Attention normal.         Mood and Affect: Mood and affect normal.         Speech: Speech normal.         Behavior: Behavior normal. Behavior is cooperative.         Thought Content: Thought content is not paranoid or delusional. Thought content does not include homicidal or suicidal ideation. Thought content does not include homicidal or suicidal plan.         Cognition and Memory: Cognition and memory normal.         Vital Signs:   Vitals:    12/11/23 0904   BP: 160/92   Pulse: 78   SpO2: 98%   Weight: 96.6 kg (213 lb)   Height: 182.9 cm (72\")     Body mass index is 28.89 kg/m².     Mental Status Exam:   Hygiene:   good  Cooperation:  Cooperative  Eye Contact:  Good  Psychomotor Behavior:  Appropriate  Affect:  Full range  Mood: normal  Speech:  Normal  Thought Process:  Goal directed  Thought Content:  Normal  Suicidal:  None  Homicidal:  None  Hallucinations:  None  Delusion:  None  Memory:  " Intact  Orientation:  Person, Place, Time, and Situation  Reliability:  good  Insight:  Good  Judgement:  Fair  Impulse Control:  Fair    Assessment / Plan      -This is my initial appointment with Nilson.  Based on his history today IOP recommended and screener scheduled.  Discussed off-label MAT options for methamphetamine cravings.  He feels like he is currently doing well overall and declines any intervention.  He is welcome to return at any time for this.  -Advisement to take part in and remain active in 12 Step Recovery Meetings, IOP, and/or 1:1 therapy/counseling and to establish/maintain an active relationship with a recovery sponsor.  Recovery meeting list for [facility] given.  -Continued monitoring for illicit substances for patient safety, medication compliance and future care  -JULIANA signed for [facility] courts  -JULIANA signed for [facility] and referred for peer support, TCM  -Needs PCP and contact info for Dr. Jaime colbert  -Declines Narcan sample.  Has some at home and OD education previously provided.    Assessment & Plan   Problems Addressed this Visit    None  Visit Diagnoses       Methamphetamine use disorder, severe, dependence    -  Primary    Relevant Orders    Urine Drug Screen - Supervised - Urine, Clean Catch    [facility] Tox - Urine, Clean Catch    Cannabis use disorder, severe, dependence        Opioid use disorder, severe, in sustained remission        Alcohol use disorder, severe, in sustained remission              Diagnoses         Codes Comments    Methamphetamine use disorder, severe, dependence    -  Primary ICD-10-CM: F15.20  ICD-9-CM: 304.40     Cannabis use disorder, severe, dependence     ICD-10-CM: F12.20  ICD-9-CM: 304.30     Opioid use disorder, severe, in sustained remission     ICD-10-CM: F11.21  ICD-9-CM: 304.03     Alcohol use disorder, severe, in sustained remission     ICD-10-CM: F10.21  ICD-9-CM: 303.93             Visit Diagnoses:     ICD-10-CM ICD-9-CM   1. Methamphetamine use disorder, severe, dependence  F15.20 304.40   2. Cannabis use disorder, severe, dependence  F12.20 304.30   3. Opioid use disorder, severe, in sustained remission  F11.21 304.03   4. Alcohol use disorder, severe, in sustained remission  F10.21 303.93       PLAN:  Safety: No acute safety concerns  Risk Assessment: Risk of self-harm acutely is low. Risk of self-harm chronically is also low, but could be further elevated in the event of treatment noncompliance and/or AODA.    TREATMENT PLAN: Continue supportive psychotherapy efforts and medications as indicated. Treatment and medication options discussed during today's visit. Patient acknowledged and verbally consented to continue with current treatment plan and was educated on the importance of compliance with treatment and follow-up appointments.    GOALS:  Short Term Goals: Patient will be compliant with medication, and patient will have no significant medication related side effects.  Patient will be engaged in psychotherapy as indicated.  Patient will report subjective improvement of symptoms.  Long term goals: To stabilize mood and treat/improve subjective symptoms, the patient will stay out of the hospital, the patient will be at an optimal level of functioning, and the patient will take all medications as prescribed.  The patient/guardian verbalized understanding and agreement with goals that were mutually set.    MEDICATION ISSUES:  MAXWELL reviewed as expected.  Discussed medication options and treatment plan of prescribed medication as well as the risks, benefits, and side effects including potential falls, possible impaired driving and metabolic adversities among others. Patient is agreeable to call the office with any worsening of symptoms or onset of side effects. Patient is agreeable to call 911 or go to the nearest ER should he/she begin having SI/HI. No medication side effects or related complaints today.        TOBACCO USE:  Current every day smoker less than 3 minutes spent counseling Will try to cut down    I advised Nilson Miller of the risks of tobacco use.     MEDS ORDERED DURING VISIT:  No orders of the defined types were placed in this encounter.      Return if symptoms worsen or fail to improve.                 This document has been electronically signed by GARDENIA Casas  December 11, 2023 10:29 EST      Part of this note may be an electronic transcription/translation of spoken language to printed text using the Dragon Dictation System.

## 2023-12-13 LAB
HCV RNA SERPL NAA+PROBE-ACNC: NORMAL IU/ML
TEST INFORMATION: NORMAL

## 2023-12-21 ENCOUNTER — OFFICE VISIT (OUTPATIENT)
Dept: PSYCHIATRY | Facility: CLINIC | Age: 43
End: 2023-12-21
Payer: COMMERCIAL

## 2023-12-21 DIAGNOSIS — F15.20 METHAMPHETAMINE USE DISORDER, SEVERE, DEPENDENCE: ICD-10-CM

## 2023-12-21 DIAGNOSIS — F10.21 ALCOHOL USE DISORDER, SEVERE, IN SUSTAINED REMISSION: ICD-10-CM

## 2023-12-21 DIAGNOSIS — F11.21 OPIOID USE DISORDER, SEVERE, IN SUSTAINED REMISSION: Primary | ICD-10-CM

## 2023-12-21 DIAGNOSIS — F12.10 CANNABIS USE DISORDER, MILD, ABUSE: ICD-10-CM

## 2023-12-21 PROCEDURE — 90791 PSYCH DIAGNOSTIC EVALUATION: CPT | Performed by: SOCIAL WORKER

## 2023-12-25 NOTE — PROGRESS NOTES
IOP Initial Assessment   Assessment completed on 12/21/2023.  Date: 12/21/2023  Name: Nilson Miller    PCP: Kylah Saenz APRN (Per System). Patient reports no PCP.   Referred by: Court.     Identifying Information:   Nilson Miller, is a 43 y.o. male presents for an initial IOP assessment with Octaviano Crawley LCSW at [facility]. Patient reports that he currently lives in [place]. Patient reports that he lives with his aunt. Patient reports living situation is stable. Patient reports that his fiancé will be moving in with them soon. Patient reports that he has three children (ages 24, 20, and 1 month old). Patient reports that this fiancé is sober. Patient reports that he is employed full time through [employer]. Patient reports license status is inactive. Patient reports that his aunt helps with transportation. Patient identified sober support system as his aunt, mother, father, and fiancé. Patient reports that his motivation for treatment is to be a father for his youngest. Patient reports legal situation is a possession of methamphetamine and marijuana charge. Patient reports he received this charge around the first of December. Patient reports he spent 7-8 days in [facility]. Patient reports next court date is January 3rd. Patient reports that his  is Martin Freed. Note that patient denied any substance use around his 1-month-old. Patient reports no drugs in the home currently. Patient denied that substance use interferes with his caregiving responsibilities.     History Present Illness, Onset, Duration, Course:   Patient during assessment discussed substance use history. Patient reports history of alcohol use. Patient reports age at first use was age 15. Patient reports amount used was a 12 pack of beer and a couple pints of vodka. Patient reports he drank at this rate for 10 years. Patient reports last alcohol use was age [age].     Patient reports history of marijuana use.  Patient reports age at first use was age 14. Patient reports amount used was a couple of joints per day. Patient reports last marijuana use was before halfway at the beginning of this month.     Patient reports history of pain pill use. Patient reports age at first use was age 25. Patient reports he was initially prescribed these, but then progressed to buying off of the street. Patient reports route of ingestion was snorting and IV use. Patient reports no use in 5 years.     Patient reports history of heroin use. Patient reports age at first use was age 35. Patient reports route of ingestion was IV use. Patient reports amount used was a half gram. Patient reports last heroin use was age [age].     Patient reports history of methamphetamine use. Patient reports age at first use was age 19 or 20. Patient reports route of ingestion was eating and snorting. Patient reports he uses in the morning. Patient reports amount used was 0.10 gram. Patient reports last methamphetamine use was 4-5 days prior to assessment.     Previous Psychiatric History:    History of prior treatment or hospitalization: Patient reports previous treatment history includes a SAP program, anger management, and [facility] in [facility]. Patient denied history of rehabs or detoxes. Patient denied history of mental health hospitalizations. Patient reports he was previously prescribed suboxone and wants to try this again.     History of use of psychotropics: Patient denied being prescribed any mental health medications currently.     History of suicide attempts: Patient during assessment denied history of suicide attempts or self-harm.     History of violence: Patient during assessment denied history of violence.     Personal Assessment: 1-10 Scale (10 worst)    Anxiety: 1      Depression: 1      Suicidal Ideation:   Patient during assessment denied current suicidal thoughts, plan or intent to hurt self, or death wishes. Patient during assessment denied history of  suicidal thoughts or plan or intent to hurt self. Patient during assessment denied history of suicide attempts or self-harm.     Patient during assessment denied current or history of homicidal thoughts or plan or intent to hurt others. Patient during assessment denied history of violence.     Patient during assessment denied history of hallucinations.     Family Psychiatric History:  Patient denied family history of mental health. Patient reports that he had a cousin that previously used substances.     Life Events/Trauma History:   When asked about trauma/abuse history, patient reports that he was in [place], but patient stated that it does not bother him now. Patient denied anything that he wants to report.     Medical History:   I have reviewed this patient's past medical history.    Are there any significant health issues (current or past): Patient reports medical conditions include Hep C treatment, bleeding varices in his esophagus, and Cirrhosis. Patient denied history of seizures. Patient reports no PCP.     History of seizures: Patient during assessment denied history of seizures.     No family history on file.    Current Medications:   Current Outpatient Medications   Medication Sig Dispense Refill    ferrous sulfate 324 (65 Fe) MG tablet delayed-release EC tablet Take 1 tablet by mouth Every Other Day.      furosemide (LASIX) 20 MG tablet Take 1 tablet by mouth Daily.      Magnesium Oxide -Mg Supplement 400 (240 Mg) MG tablet TAKE ONE TABLET BY MOUTH 2 TIMES DAILY      omeprazole (priLOSEC) 20 MG capsule Take 1 capsule by mouth Daily.      pantoprazole (PROTONIX) 40 MG EC tablet Take 1 tablet by mouth Daily.      Sofosbuvir-Velpatasvir 400-100 MG tablet Take 1 tablet by mouth Daily.      spironolactone (ALDACTONE) 50 MG tablet Take 1 tablet by mouth Daily.       No current facility-administered medications for this visit.       Relational History:  Difficulty getting along with peers: no  Difficulty making  new friendships: no  Difficulty maintaining friendships: no  Close with family members: yes    Legal History:  Patient reports legal situation is a possession of methamphetamine and marijuana charge. Patient reports he received this charge around the first of December. Patient reports he spent 7-8 days in skilled nursing. Patient reports next court date is January 3rd. Patient reports that his  is Martin Freed.     Substance Abuse History:   Patient during assessment discussed substance use history. Patient reports history of alcohol use. Patient reports age at first use was age 15. Patient reports amount used was a 12 pack of beer and a couple pints of vodka. Patient reports he drank at this rate for 10 years. Patient reports last alcohol use was age [age].     Patient reports history of marijuana use. Patient reports age at first use was age 14. Patient reports amount used was a couple of joints per day. Patient reports last marijuana use was before skilled nursing at the beginning of this month.     Patient reports history of pain pill use. Patient reports age at first use was age 25. Patient reports he was initially prescribed these, but then progressed to buying off of the street. Patient reports route of ingestion was snorting and IV use. Patient reports no use in 5 years.     Patient reports history of heroin use. Patient reports age at first use was age 35. Patient reports route of ingestion was IV use. Patient reports amount used was a half gram. Patient reports last heroin use was age [age].     Patient reports history of methamphetamine use. Patient reports age at first use was age 19 or 20. Patient reports route of ingestion was eating and snorting. Patient reports he uses in the morning. Patient reports amount used was 0.10 gram. Patient reports last methamphetamine use was 4-5 days prior to assessment.     History of DT's: Patient reports that he does have a history of DT's.                      History of  Seizures: Patient during assessment denied history of seizures.     Withdrawal Symptoms: When asked about withdrawal symptoms, patient reports mental withdrawal symptoms, aches, sweats, and leg cramps. Patient reports current withdrawal symptoms from methamphetamine is tired.       Cravings: Patient during assessment rated cravings as a 5 on a 1:10 scale (1-low).     Mental Status Exam:   Affect: Appropriate  Cooperation: Cooperative  Delusion: None  Eye Contact: Good  Hallucinations: None  Homicidal: None  Suicidal: None  Cognition: Good  Memory: Intact  Orientation: Person  Psychomotor Behaviors: Appropriate  Speech: Normal  Though Content: Normal  Thought Progress: Linear  Hopelessness: Patient during assessment reported no feelings of hopelessness.   Reliability: fair  Insight: Fair  Judgement: Fair  Impulse Control: Fair  Physical/Medical Issues: Patient reports medical conditions include Hep C treatment, bleeding varices in his esophagus, and Cirrhosis. Patient during assessment denied history of seizures. Patient reports no PCP.     Patient resources/assets: Patient reports that he is employed full time through [employer]. Patient reports that his aunt helps with transportation. Patient identified sober support system as his aunt, mother, father, and fiancé.     ASAM Dimensions:  I.    Intoxication/Withdrawal:  1  (When asked about withdrawal symptoms, patient reports mental withdrawal symptoms, aches, sweats, and leg cramps. Patient reports current withdrawal symptoms from methamphetamine is tired).  II.   Medical Conditions/Complications:  2 (Patient reports medical conditions include Hep C treatment, bleeding varices in his esophagus, and Cirrhosis. Patient during assessment denied history of seizures. Patient reports no PCP).  III.  Behavioral/Emotional/Cognitive: 0 (Patient denied history of suicidal or homicidal thoughts).  IV.  Readiness to Change: 2 (Patient identified a motivation for  treatment but was referred for assessment by the court).  V.   Relapse Risk: 2 (Due to patient reported substance use history).  VI:  Recovery Environment: 1 (Patient described stable living situation but reports license status is inactive).    Total ASAM Score =  08    Baseline Scores: 12/21/2023  RIN-7   (02)                  PHQ-9   (00)       Impression/Formulation: DSM-5 Substance Use Disorder Checklist completed during previous appointment with APRN verbally reviewed with patient during this assessment and discussed with patient. Based on patient self-report during this assessment, patient met 7 of 11 criteria for alcohol use, 2 of 11 criteria for marijuana use, 5 of 11 criteria for methamphetamine use, and 9 of 11 criteria for opioid use. Therefore, diagnoses of Opioid use disorder, severe, in sustained remission; Alcohol use disorder, severe, in sustained remission; Methamphetamine use disorder, moderate, dependence; and Cannabis use disorder, mild, abuse listed.       DSM 5 Substance Use Disorder Checklist   Alcohol, Marijuana, Methamphetamine, and Opioid use.   Diagnostic Criteria   (Substance use disorder requires at least 2 criteria be met within 12 month period)   Meets Criteria          Yes / No  Notes/Supporting Information    Substance often taken in larger amounts or over a longer period than intended.  yes Alcohol, Marijuana, Methamphetamine, and Opioid use.    2.  There is a persistent desire or unsuccessful effort to cut        down or control th substance use.  yes Methamphetamine and Pain Pill use.    3.  A great deal of time is spent in activities necessary to        obtain the substance, use the substance, or recover        from its effects.  yes Pain Pill use.    4.  Craving or a strong desire to use the substance.  yes Alcohol, Methamphetamine, and Opioid use.    5.  Recurrent substance use resulting in failure to fulfill        major role obligations at work, school, or home.  yes Pain  Pill use.    6.  Continued substance use despite having persistent or         recurrent social or interpersonal problems caused or         exacerbated by the effects of the substance.  yes Alcohol and Pain Pill use.    7.   Important social, occupational or recreational activities        are given up or reduced because of substance use.  yes Pain Pill use.    8.   Recurrent substance use in situations in which it is        physically hazardous.  yes Alcohol and Pain Pill use.    9.  Continued use despite knowledge of having a         persistent or recurrent physical or psychological         problem that is likely to have been caused or        exacerbated by the substance.  yes Alcohol use.    10. * Tolerance, as defined by either of the following:          a. A need for markedly increased amounts of the              Substance to achieve intoxication or desired effect.          b. Markedly diminished effect with continued use of              The same substance.  yes Alcohol, Marijuana, Methamphetamine, and Opioid use.    11.  * Withdrawal, as manifested by either of the following:         a. The characteristic withdrawal for the substance.          b. The same (or closely related) substance is taken to               Relieve or avoid withdrawal symptoms.  yes Alcohol, Opioid, and Methamphetamine use.    **This criterion is not considered to be met for those individuals taking prescriptions opiates solely under medical supervision. **   Severity: Mild: 2-3 symptoms, Moderate: 4-5 symptoms, Severe: 6 or more symptoms.         VISIT DIAGNOSIS:     ICD-10-CM ICD-9-CM   1. Opioid use disorder, severe, in sustained remission  F11.21 304.03   2. Alcohol use disorder, severe, in sustained remission  F10.21 303.93   3. Methamphetamine use disorder, severe, dependence  F15.20 304.40   4. Cannabis use disorder, mild, abuse  F12.10 305.20        Patient appeared alert and oriented.      Plan:  Confidentiality and reporting  requirements verbally explained to patient during assessment and patient verbally agreed.     Safety plan of report to police or hospital, or call 911 if feeling unsafe, if having suicidal or homicidal thoughts, or if in emergency need of medications verbally reviewed with patient during assessment and suicide prevention hotline number verbally provided to [employer] during assessment, patient during assessment verbally agreed to safety plan. Patient during assessment signed a hard copy of this safety plan which has been scanned into chart.     Based on patient self-report during assessment, patient would likely benefit from CD-IOP. Patient agreed to CD-IOP start date of 12/28/2023.     Patient has been scheduled with GARDENIA Casas, for 12/27/2023 to discuss MAT.     Clinician on 12/25/2023 sent  Rubén email notifying him of patient's need for assistance with getting SNAP and help with getting license.     Octaviano Crawley LCSW  12/25/2023  18:33 EST

## 2023-12-27 ENCOUNTER — OFFICE VISIT (OUTPATIENT)
Dept: PSYCHIATRY | Facility: CLINIC | Age: 43
End: 2023-12-27
Payer: COMMERCIAL

## 2023-12-27 VITALS
HEIGHT: 72 IN | SYSTOLIC BLOOD PRESSURE: 136 MMHG | DIASTOLIC BLOOD PRESSURE: 84 MMHG | OXYGEN SATURATION: 98 % | BODY MASS INDEX: 29.53 KG/M2 | WEIGHT: 218 LBS | HEART RATE: 72 BPM

## 2023-12-27 DIAGNOSIS — F15.20 METHAMPHETAMINE USE DISORDER, SEVERE, DEPENDENCE: ICD-10-CM

## 2023-12-27 DIAGNOSIS — F41.1 GENERALIZED ANXIETY DISORDER: Primary | ICD-10-CM

## 2023-12-27 RX ORDER — ESCITALOPRAM OXALATE 5 MG/1
5 TABLET ORAL DAILY
Qty: 30 TABLET | Refills: 0 | Status: SHIPPED | OUTPATIENT
Start: 2023-12-27 | End: 2024-01-26

## 2023-12-27 NOTE — PROGRESS NOTES
Office  Follow Up Visit      Patient Name: Nilson Miller  : 1980   MRN: [id number]     Referring Provider: Kylah Saenz APRN    Chief Complaint: Substance use    History of Present Illness:   Nilson Miller is a 43 y.o. male who is here today for follow up related to SAWYER and mood. Has had no further marijuana use and has been trying to cut back on meth use (last use 3 days ago). Struggling more with overall anxiety, worry, worries about a lot of different things, depressed mood at times, fatigue, fear of failing. Sleeping as well as he can with infant in the home. Would like to try pharmacological intervention for mood and methamphetamine cravings. Suboxone has been helpful in the past. Denies any opioid intake since 2019 and is not having any cravings. Was on Wellbutrin x18 months while incarcerated and did not think it was helpful. Currently in treatment for cirrhosis and hep c. Had recent EGD with only one small varices remaining that did not need to be banded.  Denies any SI/HI, AVH.    Triggers: Fatigue, habit, seeing needles in the past    Cravings: Intermittent    Relapse Prevention: IOP screener scheduled, TCM, peer support, recovery meetings encouraged    Urine Drug Screen (today's visit) discussed: Ordered    UDS Confirmation: N/A    [facility] (PDMP) Reviewed for Current/Active Medications: No active medications    Answers submitted by the patient for this visit:  Primary Reason for Visit (Submitted on 2023)  What is the primary reason for your visit?: Other  Other (Submitted on 2023)  Please describe your symptoms.: Anxiety and lack of energy. Just needing something to help me get through this without failure  Have you had these symptoms before?: Yes  How long have you been having these symptoms?: 1-2 weeks  Please describe any probable cause for these symptoms. : Withdrawal      Past Surgical History:  Past Surgical History:   Procedure Laterality Date    KNEE  "RECONSTRUCTION, MEDIAL PATELLAR FEMORAL LIGAMENT         Problem List:  There is no problem list on file for this patient.      Allergy:   Allergies   Allergen Reactions    Penicillins Hives    Sulfa Antibiotics Hives        Current Medications:   Current Outpatient Medications   Medication Sig Dispense Refill    ferrous sulfate 324 (65 Fe) MG tablet delayed-release EC tablet Take 1 tablet by mouth Every Other Day.      furosemide (LASIX) 20 MG tablet Take 1 tablet by mouth Daily.      Magnesium Oxide -Mg Supplement 400 (240 Mg) MG tablet TAKE ONE TABLET BY MOUTH 2 TIMES DAILY      omeprazole (priLOSEC) 20 MG capsule Take 1 capsule by mouth Daily.      pantoprazole (PROTONIX) 40 MG EC tablet Take 1 tablet by mouth Daily.      Sofosbuvir-Velpatasvir 400-100 MG tablet Take 1 tablet by mouth Daily.      spironolactone (ALDACTONE) 50 MG tablet Take 1 tablet by mouth Daily.      escitalopram (Lexapro) 5 MG tablet Take 1 tablet by mouth Daily for 30 days. 30 tablet 0     No current facility-administered medications for this visit.       Past Medical History:  History reviewed. No pertinent past medical history.    Social History:  Social History     Socioeconomic History    Marital status: Legally    Tobacco Use    Smoking status: Every Day     Packs/day: .25     Types: Cigarettes     Passive exposure: Current    Smokeless tobacco: Never   Vaping Use    Vaping Use: Never used   Substance and Sexual Activity    Alcohol use: Not Currently     Comment: \"occasionally\"    Drug use: Yes     Types: Methamphetamines, Marijuana    Sexual activity: Defer       Family History:  History reviewed. No pertinent family history.      Subjective      Review of Systems:   Review of Systems   Constitutional:  Positive for fatigue. Negative for chills and fever.   Respiratory:  Negative for shortness of breath.    Cardiovascular:  Negative for chest pain.   Gastrointestinal:  Negative for abdominal pain.   Skin:  Negative for skin " lesions.   Neurological:  Negative for seizures and confusion.   Psychiatric/Behavioral:  Positive for depressed mood and stress. Negative for hallucinations, sleep disturbance and suicidal ideas. The patient is nervous/anxious.        PHQ-9 Depression Screening  Little interest or pleasure in doing things? 1-->several days   Feeling down, depressed, or hopeless? 2-->more than half the days   Trouble falling or staying asleep, or sleeping too much? 2-->more than half the days   Feeling tired or having little energy? 1-->several days   Poor appetite or overeating? 1-->several days   Feeling bad about yourself - or that you are a failure or have let yourself or your family down? 0-->not at all   Trouble concentrating on things, such as reading the newspaper or watching television? 0-->not at all   Moving or speaking so slowly that other people could have noticed? Or the opposite - being so fidgety or restless that you have been moving around a lot more than usual? 0-->not at all   Thoughts that you would be better off dead, or of hurting yourself in some way? 0-->not at all   PHQ-9 Total Score 7   If you checked off any problems, how difficult have these problems made it for you to do your work, take care of things at home, or get along with other people? somewhat difficult        RIN-7 Score:   Feeling nervous, anxious or on edge: Not at all  Not being able to stop or control worrying: Not at all  Worrying too much about different things: Not at all  Trouble Relaxing: Not at all  Being so restless that it is hard to sit still: Several days  Feeling afraid as if something awful might happen: Several days  Becoming easily annoyed or irritable: Several days  RIN 7 Total Score: 3  If you checked any problems, how difficult have these problems made it for you to do your work, take care of things at home, or get along with other people: Not difficult at all    Patient History:   The following portions of the patient's  "history were reviewed and updated as appropriate: allergies, current medications, past family history, past medical history, past social history, past surgical history and problem list.     Social:  Social History     Socioeconomic History    Marital status: Legally    Tobacco Use    Smoking status: Every Day     Packs/day: .25     Types: Cigarettes     Passive exposure: Current    Smokeless tobacco: Never   Vaping Use    Vaping Use: Never used   Substance and Sexual Activity    Alcohol use: Not Currently     Comment: \"occasionally\"    Drug use: Yes     Types: Methamphetamines, Marijuana    Sexual activity: Defer       Medications:     Current Outpatient Medications:     ferrous sulfate 324 (65 Fe) MG tablet delayed-release EC tablet, Take 1 tablet by mouth Every Other Day., Disp: , Rfl:     furosemide (LASIX) 20 MG tablet, Take 1 tablet by mouth Daily., Disp: , Rfl:     Magnesium Oxide -Mg Supplement 400 (240 Mg) MG tablet, TAKE ONE TABLET BY MOUTH 2 TIMES DAILY, Disp: , Rfl:     omeprazole (priLOSEC) 20 MG capsule, Take 1 capsule by mouth Daily., Disp: , Rfl:     pantoprazole (PROTONIX) 40 MG EC tablet, Take 1 tablet by mouth Daily., Disp: , Rfl:     Sofosbuvir-Velpatasvir 400-100 MG tablet, Take 1 tablet by mouth Daily., Disp: , Rfl:     spironolactone (ALDACTONE) 50 MG tablet, Take 1 tablet by mouth Daily., Disp: , Rfl:     escitalopram (Lexapro) 5 MG tablet, Take 1 tablet by mouth Daily for 30 days., Disp: 30 tablet, Rfl: 0    Objective     Physical Exam:  Physical Exam  Vitals reviewed.   Constitutional:       General: He is not in acute distress.     Appearance: He is well-developed. He is not ill-appearing.   Pulmonary:      Effort: No respiratory distress.   Neurological:      Mental Status: He is alert and oriented to person, place, and time.      Gait: Gait normal.   Psychiatric:         Attention and Perception: Attention normal.         Mood and Affect: Mood and affect normal.         Speech: " "Speech normal.         Behavior: Behavior normal. Behavior is cooperative.         Thought Content: Thought content is not paranoid or delusional. Thought content does not include homicidal or suicidal ideation. Thought content does not include homicidal or suicidal plan.         Cognition and Memory: Cognition and memory normal.         Vital Signs:   Vitals:    12/27/23 0915   BP: 136/84   Pulse: 72   SpO2: 98%   Weight: 98.9 kg (218 lb)   Height: 182.9 cm (72\")     Body mass index is 29.57 kg/m².     Mental Status Exam:   Hygiene:   good  Cooperation:  Cooperative  Eye Contact:  Good  Psychomotor Behavior:  Appropriate  Affect:  Full range  Mood: normal  Speech:  Normal  Thought Process:  Goal directed  Thought Content:  Normal  Suicidal:  None  Homicidal:  None  Hallucinations:  None  Delusion:  None  Memory:  Intact  Orientation:  Person, Place, Time, and Situation  Reliability:  good  Insight:  Fair  Judgement:  Fair  Impulse Control:  Fair    Assessment / Plan      -He has multiple sources of fatigue (Hep C meds, cirrhosis, physical job, infant in the home, quitting meth) and it is difficult for medication to compete with that. We discussed Wellbutrin can be more stimulating and may help off label with the methamphetamine cravings. He does not think it was helpful in the past and would like to try something different.  -Start escitalopram 5mg daily for anxiety. Discussed AE of medication and when to call. Discussed it will not likely help with fatigue but can help with depressed mood, anxiety, worry.   -Advisement to take part in and remain active in 12 Step Recovery Meetings, IOP, and/or 1:1 therapy/counseling and to establish/maintain an active relationship with a recovery sponsor. Starting IOP this week. Discussed coping skills to use to avoid meth use.  -Continued monitoring for illicit substances for patient safety, medication compliance and future care    Assessment & Plan   Problems Addressed this " Visit    None  Visit Diagnoses       Generalized anxiety disorder    -  Primary    Relevant Medications    escitalopram (Lexapro) 5 MG tablet    Methamphetamine use disorder, severe, dependence              Diagnoses         Codes Comments    Generalized anxiety disorder    -  Primary ICD-10-CM: F41.1  ICD-9-CM: 300.02     Methamphetamine use disorder, severe, dependence     ICD-10-CM: F15.20  ICD-9-CM: 304.40               PLAN:  Safety: No acute safety concerns  Risk Assessment: Risk of self-harm acutely is low. Risk of self-harm chronically is also low, but could be further elevated in the event of treatment noncompliance and/or AODA.      TREATMENT PLAN/GOALS: Continue supportive psychotherapy efforts and medications as indicated. Treatment and medication options discussed during today's visit. Patient acknowledged and verbally consented to continue with current treatment plan and was educated on the importance of compliance with treatment and follow-up appointments.      MEDICATION ISSUES:  [facility] reviewed as expected.  Discussed medication options and treatment plan of prescribed medication as well as the risks, benefits, and side effects including potential falls, possible impaired driving and metabolic adversities among others. Patient is agreeable to call the office with any worsening of symptoms or onset of side effects. Patient is agreeable to call 911 or go to the nearest ER should he/she begin having SI/HI. No medication side effects or related complaints today.     MEDS ORDERED DURING VISIT:  New Medications Ordered This Visit   Medications    escitalopram (Lexapro) 5 MG tablet     Sig: Take 1 tablet by mouth Daily for 30 days.     Dispense:  30 tablet     Refill:  0       Return in about 4 weeks (around 1/24/2024) for Follow Up Medication.             This document has been electronically signed by GARDENIA Casas  December 27, 2023 10:21 EST      Part of this note may be an electronic  transcription/translation of spoken language to printed text using the Dragon Dictation System.

## 2023-12-28 ENCOUNTER — OFFICE VISIT (OUTPATIENT)
Dept: PSYCHIATRY | Facility: HOSPITAL | Age: 43
End: 2023-12-28
Payer: COMMERCIAL

## 2023-12-28 DIAGNOSIS — F11.21 OPIOID USE DISORDER, SEVERE, IN SUSTAINED REMISSION: Primary | ICD-10-CM

## 2023-12-28 DIAGNOSIS — F12.10 CANNABIS USE DISORDER, MILD, ABUSE: ICD-10-CM

## 2023-12-28 DIAGNOSIS — F10.21 ALCOHOL USE DISORDER, SEVERE, IN SUSTAINED REMISSION: ICD-10-CM

## 2023-12-28 DIAGNOSIS — F15.20 METHAMPHETAMINE USE DISORDER, SEVERE, DEPENDENCE: ICD-10-CM

## 2023-12-28 PROCEDURE — H0015 ALCOHOL AND/OR DRUG SERVICES: HCPCS | Performed by: NURSE PRACTITIONER

## 2024-01-01 ENCOUNTER — APPOINTMENT (OUTPATIENT)
Dept: PSYCHIATRY | Facility: HOSPITAL | Age: 44
End: 2024-01-01
Payer: COMMERCIAL

## 2024-01-02 ENCOUNTER — OFFICE VISIT (OUTPATIENT)
Dept: PSYCHIATRY | Facility: HOSPITAL | Age: 44
End: 2024-01-02
Payer: COMMERCIAL

## 2024-01-02 DIAGNOSIS — F11.21 OPIOID USE DISORDER, SEVERE, IN SUSTAINED REMISSION: Primary | ICD-10-CM

## 2024-01-02 DIAGNOSIS — F10.21 ALCOHOL USE DISORDER, SEVERE, IN SUSTAINED REMISSION: ICD-10-CM

## 2024-01-02 DIAGNOSIS — F12.10 CANNABIS USE DISORDER, MILD, ABUSE: ICD-10-CM

## 2024-01-02 DIAGNOSIS — F15.20 METHAMPHETAMINE USE DISORDER, SEVERE, DEPENDENCE: ICD-10-CM

## 2024-01-02 PROCEDURE — H0015 ALCOHOL AND/OR DRUG SERVICES: HCPCS | Performed by: NURSE PRACTITIONER

## 2024-01-02 NOTE — PROGRESS NOTES
"CD IOP GROUP     Date: 12/28/2023  Name: Nilson Miller    Time In: 1800   Time Out: 2053     Number of participants: 10    IOP GROUP NOTE     Data: 3 hour IOP group therapy session (Check-ins, Coping Skills, Relapse Prevention)     Check Ins: Therapist continued facilitation of rapport building strategies between group members. Therapist asked that each patient check in with home life and recovery efforts and identify triggers, cravings, and high risk situations that arise between group sessions. Therapist provided empathy and support during group session.     Session Content/Coping Skills: Deja, joined for first part of session. Clinician answered group members questions regarding phase II of CD-IOP. Clinician explained check in form to group members. Check ins completed by group members. Living in Balance, Session 3 continued. Group members completed relapse prevention plans. CD-IOP Jeopardy activity completed which reviewed topics covered over the past several classes.     Response: Patient attended class in person. Patient participated in completion of check in form. Patient on check in form answered no to question of \"currently or in the past 7 days, have you had any suicidal thoughts or plan or intent to hurt yourself”, and patient also answered no on check in form to question of \"currently or in the past 7 days, have you had any homicidal thoughts or plan or intent to hurt others\". Patient on check in form reported concerns with sleep in the past 7 days.     Personal Assessment 0-10 Scale (0-none, 10-high)    Anxiety:  7   Depression:  0   Cravings: 8     Assessment:     ..  Lab on 12/11/2023   Component Date Value Ref Range Status    Hepatitis C Quantitation 12/11/2023 HCV Not Detected  IU/mL Final    Test Information 12/11/2023 Comment   Final    The quantitative range of this assay is 15 IU/mL to 100 million IU/mL.    Glucose 12/11/2023 115 (H)  65 - 99 mg/dL Final    BUN " 12/11/2023 11  6 - 20 mg/dL Final    Creatinine 12/11/2023 1.04  0.76 - 1.27 mg/dL Final    Sodium 12/11/2023 140  136 - 145 mmol/L Final    Potassium 12/11/2023 4.0  3.5 - 5.2 mmol/L Final    Chloride 12/11/2023 100  98 - 107 mmol/L Final    CO2 12/11/2023 25.0  22.0 - 29.0 mmol/L Final    Calcium 12/11/2023 9.1  8.6 - 10.5 mg/dL Final    Total Protein 12/11/2023 7.8  6.0 - 8.5 g/dL Final    Albumin 12/11/2023 4.5  3.5 - 5.2 g/dL Final    ALT (SGPT) 12/11/2023 34  1 - 41 U/L Final    AST (SGOT) 12/11/2023 34  1 - 40 U/L Final    Alkaline Phosphatase 12/11/2023 59  39 - 117 U/L Final    Total Bilirubin 12/11/2023 0.3  0.0 - 1.2 mg/dL Final    Globulin 12/11/2023 3.3  gm/dL Final    A/G Ratio 12/11/2023 1.4  g/dL Final    BUN/Creatinine Ratio 12/11/2023 10.6  7.0 - 25.0 Final    Anion Gap 12/11/2023 15.0  5.0 - 15.0 mmol/L Final    eGFR 12/11/2023 90.8  >60.0 mL/min/1.73 Final    Protime 12/11/2023 13.3  12.3 - 15.1 Seconds Final    INR 12/11/2023 0.96  0.90 - 1.10 Final    WBC 12/11/2023 3.73  3.40 - 10.80 10*3/mm3 Final    RBC 12/11/2023 4.62  4.14 - 5.80 10*6/mm3 Final    Hemoglobin 12/11/2023 11.4 (L)  13.0 - 17.7 g/dL Final    Hematocrit 12/11/2023 36.4 (L)  37.5 - 51.0 % Final    MCV 12/11/2023 78.8 (L)  79.0 - 97.0 fL Final    MCH 12/11/2023 24.7 (L)  26.6 - 33.0 pg Final    MCHC 12/11/2023 31.3 (L)  31.5 - 35.7 g/dL Final    RDW 12/11/2023 19.4 (H)  12.3 - 15.4 % Final    RDW-SD 12/11/2023 52.9  37.0 - 54.0 fl Final    Platelets 12/11/2023 76 (L)  140 - 450 10*3/mm3 Final    Neutrophil % 12/11/2023 47.9  42.7 - 76.0 % Final    Lymphocyte % 12/11/2023 36.5  19.6 - 45.3 % Final    Monocyte % 12/11/2023 10.5  5.0 - 12.0 % Final    Eosinophil % 12/11/2023 4.3  0.3 - 6.2 % Final    Basophil % 12/11/2023 0.8  0.0 - 1.5 % Final    Neutrophils, Absolute 12/11/2023 1.79  1.70 - 7.00 10*3/mm3 Final    Lymphocytes, Absolute 12/11/2023 1.36  0.70 - 3.10 10*3/mm3 Final    Monocytes, Absolute 12/11/2023 0.39  0.10 - 0.90  10*3/mm3 Final    Eosinophils, Absolute 12/11/2023 0.16  0.00 - 0.40 10*3/mm3 Final    Basophils, Absolute 12/11/2023 0.03  0.00 - 0.20 10*3/mm3 Final       Mental Status Exam  Hygiene:  good  Dress: casual  Attitude: cooperative and agreeable   Motor Activity: appropriate  Eye Contact:  good  Speech: regular rate and rhythm   Mood:  calm and cooperative  Affect:  Appropriate  Thought Processes:  Linear  Thought Content:  Normal  Suicidal Thoughts:  denies  Homicidal Thoughts:  denies  Crisis Safety Plan: Safety plan has been discussed.   Hallucinations:  Unknown to clinician.   Reliability: fair  Insight: fair  Judgement: fair  Impulse Control: fair    Recovery/spiritual support group attendance: No.      Progress toward goal: Not at goal    Prognosis: Fair with Ongoing Treatment     Self-reported number of days sober: Patient on check in form reported meth use 2 days ago.     Patient will contact this office, call 911 or present to the nearest emergency room should suicidal or homicidal ideations occur.    Impression/Formulation:    ICD-10-CM ICD-9-CM   1. Opioid use disorder, severe, in sustained remission  F11.21 304.03   2. Alcohol use disorder, severe, in sustained remission  F10.21 303.93   3. Methamphetamine use disorder, severe, dependence  F15.20 304.40   4. Cannabis use disorder, mild, abuse  F12.10 305.20       Clinical Maneuvering/Interventions: Therapist utilized a person-centered approach to build rapport with group member. Therapist implemented motivational interviewing techniques to assist client with exploring and resolving ambivalence associated with commitment to change behaviors related to substance use and addiction. Therapist applied cognitive behavioral strategies to facilitate identification of maladaptive patterns of thinking and behavior that contribute to client's risk for continued substance use and relapse. Therapist employed group interaction activities to build rapport among group  members, promote sobriety, and emphasize relapse prevention. Therapist promoted safe nonjudgmental environment by providing group members with unconditional positive regard and encouraging group members to comply with group rules and guidelines. Therapist assisted group member with identifying and implementing healthier coping strategies.      Plan:  Continue [facility] IOP Phase I   Aftercare:  [facility] Phase II  Program Assignments:  Personal recovery plan, relapse prevention plan, attendance of recovery support group meetings, exploration of sponsorship, drug/alcohol screens.     Octaviano Crawley LCSW  1/2/2024  16:14 EST

## 2024-01-03 ENCOUNTER — OFFICE VISIT (OUTPATIENT)
Dept: PSYCHIATRY | Facility: HOSPITAL | Age: 44
End: 2024-01-03
Payer: COMMERCIAL

## 2024-01-03 DIAGNOSIS — F11.21 OPIOID USE DISORDER, SEVERE, IN SUSTAINED REMISSION: Primary | ICD-10-CM

## 2024-01-03 DIAGNOSIS — F15.20 METHAMPHETAMINE USE DISORDER, SEVERE, DEPENDENCE: ICD-10-CM

## 2024-01-03 DIAGNOSIS — F10.21 ALCOHOL USE DISORDER, SEVERE, IN SUSTAINED REMISSION: ICD-10-CM

## 2024-01-03 DIAGNOSIS — F12.10 CANNABIS USE DISORDER, MILD, ABUSE: ICD-10-CM

## 2024-01-03 PROCEDURE — H0015 ALCOHOL AND/OR DRUG SERVICES: HCPCS | Performed by: NURSE PRACTITIONER

## 2024-01-04 ENCOUNTER — OFFICE VISIT (OUTPATIENT)
Dept: PSYCHIATRY | Facility: HOSPITAL | Age: 44
End: 2024-01-04
Payer: COMMERCIAL

## 2024-01-04 DIAGNOSIS — F15.20 METHAMPHETAMINE USE DISORDER, SEVERE, DEPENDENCE: ICD-10-CM

## 2024-01-04 DIAGNOSIS — F11.21 OPIOID USE DISORDER, SEVERE, IN SUSTAINED REMISSION: Primary | ICD-10-CM

## 2024-01-04 DIAGNOSIS — F10.21 ALCOHOL USE DISORDER, SEVERE, IN SUSTAINED REMISSION: ICD-10-CM

## 2024-01-04 DIAGNOSIS — F12.10 CANNABIS USE DISORDER, MILD, ABUSE: ICD-10-CM

## 2024-01-04 PROCEDURE — H0015 ALCOHOL AND/OR DRUG SERVICES: HCPCS | Performed by: NURSE PRACTITIONER

## 2024-01-04 NOTE — PROGRESS NOTES
CD IOP GROUP     Date: 01/02/2024  Name: Nilson Miller    Time In: Patient arrived to group at approximately 06:13 PM.    Time Out: 2046     Number of participants: 9    IOP GROUP NOTE     Data: 3 hour IOP group therapy session (Check-ins, Coping Skills, Relapse Prevention)     Check Ins: Therapist continued facilitation of rapport building strategies between group members. Therapist asked that each patient check in with home life and recovery efforts and identify triggers, cravings, and high risk situations that arise between group sessions. Therapist provided empathy and support during group session.     Session Content/Coping Skills: Deja, joined for first part of session. Andrew, [facility] Liaison also joined for first part of session. Check ins completed by group members. Clinician educated group members on setting SMART goals. Group members practiced setting SMART goals. Therapist Aid “The Cognitive Model” reviewed and discussed. Living in Balance, Session 20, “Attitudes and Beliefs” began.     Response: Patient attended class in person. Patient participated in completion of check in form. Patient on check in form answered no to question of “currently or since your last group meeting, have you had any suicidal thoughts or plan or intent to hurt yourself”, and patient also answered no on check in form to question of “currently or since your last group meeting, have you had any homicidal thoughts or plan or intent to hurt others”. Patient on check in form reported concerns with sleep since last group meeting.     Personal Assessment 0-10 Scale (0-none, 10-high)    Anxiety:  5   Depression:  0   Cravings: 5     Assessment:     ..  No visits with results within 3 Week(s) from this visit.   Latest known visit with results is:   Lab on 12/11/2023   Component Date Value Ref Range Status    Hepatitis C Quantitation 12/11/2023 HCV Not Detected   IU/mL Final    Test Information 12/11/2023 Comment   Final    The quantitative range of this assay is 15 IU/mL to 100 million IU/mL.    Glucose 12/11/2023 115 (H)  65 - 99 mg/dL Final    BUN 12/11/2023 11  6 - 20 mg/dL Final    Creatinine 12/11/2023 1.04  0.76 - 1.27 mg/dL Final    Sodium 12/11/2023 140  136 - 145 mmol/L Final    Potassium 12/11/2023 4.0  3.5 - 5.2 mmol/L Final    Chloride 12/11/2023 100  98 - 107 mmol/L Final    CO2 12/11/2023 25.0  22.0 - 29.0 mmol/L Final    Calcium 12/11/2023 9.1  8.6 - 10.5 mg/dL Final    Total Protein 12/11/2023 7.8  6.0 - 8.5 g/dL Final    Albumin 12/11/2023 4.5  3.5 - 5.2 g/dL Final    ALT (SGPT) 12/11/2023 34  1 - 41 U/L Final    AST (SGOT) 12/11/2023 34  1 - 40 U/L Final    Alkaline Phosphatase 12/11/2023 59  39 - 117 U/L Final    Total Bilirubin 12/11/2023 0.3  0.0 - 1.2 mg/dL Final    Globulin 12/11/2023 3.3  gm/dL Final    A/G Ratio 12/11/2023 1.4  g/dL Final    BUN/Creatinine Ratio 12/11/2023 10.6  7.0 - 25.0 Final    Anion Gap 12/11/2023 15.0  5.0 - 15.0 mmol/L Final    eGFR 12/11/2023 90.8  >60.0 mL/min/1.73 Final    Protime 12/11/2023 13.3  12.3 - 15.1 Seconds Final    INR 12/11/2023 0.96  0.90 - 1.10 Final    WBC 12/11/2023 3.73  3.40 - 10.80 10*3/mm3 Final    RBC 12/11/2023 4.62  4.14 - 5.80 10*6/mm3 Final    Hemoglobin 12/11/2023 11.4 (L)  13.0 - 17.7 g/dL Final    Hematocrit 12/11/2023 36.4 (L)  37.5 - 51.0 % Final    MCV 12/11/2023 78.8 (L)  79.0 - 97.0 fL Final    MCH 12/11/2023 24.7 (L)  26.6 - 33.0 pg Final    MCHC 12/11/2023 31.3 (L)  31.5 - 35.7 g/dL Final    RDW 12/11/2023 19.4 (H)  12.3 - 15.4 % Final    RDW-SD 12/11/2023 52.9  37.0 - 54.0 fl Final    Platelets 12/11/2023 76 (L)  140 - 450 10*3/mm3 Final    Neutrophil % 12/11/2023 47.9  42.7 - 76.0 % Final    Lymphocyte % 12/11/2023 36.5  19.6 - 45.3 % Final    Monocyte % 12/11/2023 10.5  5.0 - 12.0 % Final    Eosinophil % 12/11/2023 4.3  0.3 - 6.2 % Final    Basophil % 12/11/2023 0.8  0.0 - 1.5 % Final     Neutrophils, Absolute 12/11/2023 1.79  1.70 - 7.00 10*3/mm3 Final    Lymphocytes, Absolute 12/11/2023 1.36  0.70 - 3.10 10*3/mm3 Final    Monocytes, Absolute 12/11/2023 0.39  0.10 - 0.90 10*3/mm3 Final    Eosinophils, Absolute 12/11/2023 0.16  0.00 - 0.40 10*3/mm3 Final    Basophils, Absolute 12/11/2023 0.03  0.00 - 0.20 10*3/mm3 Final       Mental Status Exam  Hygiene:  good  Dress: casual  Attitude: cooperative and agreeable   Motor Activity: appropriate  Eye Contact:  good  Speech: regular rate and rhythm   Mood:  calm and cooperative  Affect:  Appropriate  Thought Processes:  Linear  Thought Content:  Normal  Suicidal Thoughts:  denies  Homicidal Thoughts:  denies  Crisis Safety Plan: Safety plan has been discussed.   Hallucinations:  Unknown to clinician.   Reliability: fair  Insight: fair  Judgement: fair  Impulse Control: fair    Recovery/spiritual support group attendance: No.      Progress toward goal: Not at goal    Prognosis: Fair with Ongoing Treatment     Self-reported number of days sober: Patient on check in form reported 7 days.     Patient will contact this office, call 911 or present to the nearest emergency room should suicidal or homicidal ideations occur.    Impression/Formulation:    ICD-10-CM ICD-9-CM   1. Opioid use disorder, severe, in sustained remission  F11.21 304.03   2. Alcohol use disorder, severe, in sustained remission  F10.21 303.93   3. Methamphetamine use disorder, severe, dependence  F15.20 304.40   4. Cannabis use disorder, mild, abuse  F12.10 305.20       Clinical Maneuvering/Interventions: Therapist utilized a person-centered approach to build rapport with group member. Therapist implemented motivational interviewing techniques to assist client with exploring and resolving ambivalence associated with commitment to change behaviors related to substance use and addiction. Therapist applied cognitive behavioral strategies to facilitate identification of maladaptive patterns of  thinking and behavior that contribute to client's risk for continued substance use and relapse. Therapist employed group interaction activities to build rapport among group members, promote sobriety, and emphasize relapse prevention. Therapist promoted safe nonjudgmental environment by providing group members with unconditional positive regard and encouraging group members to comply with group rules and guidelines. Therapist assisted group member with identifying and implementing healthier coping strategies.      Plan:  Continue [facility] IOP Phase I   Aftercare:  [facility] Phase II  Program Assignments:  Personal recovery plan, relapse prevention plan, attendance of recovery support group meetings, exploration of sponsorship, drug/alcohol screens.     Octaviano Crawley LCSW  1/4/2024  13:53 EST

## 2024-01-08 ENCOUNTER — OFFICE VISIT (OUTPATIENT)
Dept: PSYCHIATRY | Facility: HOSPITAL | Age: 44
End: 2024-01-08
Payer: COMMERCIAL

## 2024-01-08 ENCOUNTER — LAB (OUTPATIENT)
Dept: LAB | Facility: HOSPITAL | Age: 44
End: 2024-01-08
Payer: COMMERCIAL

## 2024-01-08 DIAGNOSIS — F15.20 METHAMPHETAMINE USE DISORDER, SEVERE, DEPENDENCE: ICD-10-CM

## 2024-01-08 DIAGNOSIS — F12.10 CANNABIS USE DISORDER, MILD, ABUSE: ICD-10-CM

## 2024-01-08 DIAGNOSIS — F11.21 OPIOID USE DISORDER, SEVERE, IN SUSTAINED REMISSION: Primary | ICD-10-CM

## 2024-01-08 DIAGNOSIS — F10.21 ALCOHOL USE DISORDER, SEVERE, IN SUSTAINED REMISSION: ICD-10-CM

## 2024-01-08 LAB
AMPHET+METHAMPHET UR QL: NEGATIVE
AMPHETAMINES UR QL: POSITIVE
BARBITURATES UR QL SCN: NEGATIVE
BENZODIAZ UR QL SCN: NEGATIVE
BUPRENORPHINE SERPL-MCNC: NEGATIVE NG/ML
CANNABINOIDS SERPL QL: NEGATIVE
COCAINE UR QL: NEGATIVE
METHADONE UR QL SCN: NEGATIVE
OPIATES UR QL: NEGATIVE
OXYCODONE UR QL SCN: NEGATIVE
PCP UR QL SCN: NEGATIVE
TRICYCLICS UR QL SCN: NEGATIVE

## 2024-01-08 PROCEDURE — 80306 DRUG TEST PRSMV INSTRMNT: CPT

## 2024-01-08 PROCEDURE — H0015 ALCOHOL AND/OR DRUG SERVICES: HCPCS | Performed by: NURSE PRACTITIONER

## 2024-01-09 NOTE — PROGRESS NOTES
CD IOP GROUP     Date: 01/03/2024  Name: Nilson Miller    Time In: 1800   Time Out: 2050     Number of participants: 10    IOP GROUP NOTE     Data: 3 hour IOP group therapy session (Check-ins, Coping Skills, Relapse Prevention)     Check Ins: Therapist continued facilitation of rapport building strategies between group members. Therapist asked that each patient check in with home life and recovery efforts and identify triggers, cravings, and high risk situations that arise between group sessions. Therapist provided empathy and support during group session.     Session Content/Coping Skills: Cong from pharmacy presented on Narcan education. Check ins completed by group members. Living in Balance- Session 20 continued. “The Disease of Addiction: Changing Addictive Thought Patterns” psychoeducational material, pages 6-12, reviewed and discussed ([facility], 2007).    Response: Patient attended class in person. Patient participated in completion of check in form. Patient on check in form answered no to question of “currently or since your last group meeting, have you had any suicidal thoughts or plan or intent to hurt yourself”, and patient also answered no on check in form to question of “currently or since your last group meeting, have you had any homicidal thoughts or plan or intent to hurt others”. Patient on check in form reported concerns with sleep since last group meeting.     Personal Assessment 0-10 Scale (0-none, 10-high)     Anxiety:  6  Depression:  0   Cravings: 7    Assessment:    ..  No visits with results within 3 Week(s) from this visit.   Latest known visit with results is:   Lab on 12/11/2023   Component Date Value Ref Range Status    Hepatitis C Quantitation 12/11/2023 HCV Not Detected  IU/mL Final    Test Information 12/11/2023 Comment   Final    The quantitative range of this assay is 15 IU/mL to 100 million IU/mL.    Glucose 12/11/2023 115 (H)  65 - 99 mg/dL Final    BUN 12/11/2023 11  6 -  20 mg/dL Final    Creatinine 12/11/2023 1.04  0.76 - 1.27 mg/dL Final    Sodium 12/11/2023 140  136 - 145 mmol/L Final    Potassium 12/11/2023 4.0  3.5 - 5.2 mmol/L Final    Chloride 12/11/2023 100  98 - 107 mmol/L Final    CO2 12/11/2023 25.0  22.0 - 29.0 mmol/L Final    Calcium 12/11/2023 9.1  8.6 - 10.5 mg/dL Final    Total Protein 12/11/2023 7.8  6.0 - 8.5 g/dL Final    Albumin 12/11/2023 4.5  3.5 - 5.2 g/dL Final    ALT (SGPT) 12/11/2023 34  1 - 41 U/L Final    AST (SGOT) 12/11/2023 34  1 - 40 U/L Final    Alkaline Phosphatase 12/11/2023 59  39 - 117 U/L Final    Total Bilirubin 12/11/2023 0.3  0.0 - 1.2 mg/dL Final    Globulin 12/11/2023 3.3  gm/dL Final    A/G Ratio 12/11/2023 1.4  g/dL Final    BUN/Creatinine Ratio 12/11/2023 10.6  7.0 - 25.0 Final    Anion Gap 12/11/2023 15.0  5.0 - 15.0 mmol/L Final    eGFR 12/11/2023 90.8  >60.0 mL/min/1.73 Final    Protime 12/11/2023 13.3  12.3 - 15.1 Seconds Final    INR 12/11/2023 0.96  0.90 - 1.10 Final    WBC 12/11/2023 3.73  3.40 - 10.80 10*3/mm3 Final    RBC 12/11/2023 4.62  4.14 - 5.80 10*6/mm3 Final    Hemoglobin 12/11/2023 11.4 (L)  13.0 - 17.7 g/dL Final    Hematocrit 12/11/2023 36.4 (L)  37.5 - 51.0 % Final    MCV 12/11/2023 78.8 (L)  79.0 - 97.0 fL Final    MCH 12/11/2023 24.7 (L)  26.6 - 33.0 pg Final    MCHC 12/11/2023 31.3 (L)  31.5 - 35.7 g/dL Final    RDW 12/11/2023 19.4 (H)  12.3 - 15.4 % Final    RDW-SD 12/11/2023 52.9  37.0 - 54.0 fl Final    Platelets 12/11/2023 76 (L)  140 - 450 10*3/mm3 Final    Neutrophil % 12/11/2023 47.9  42.7 - 76.0 % Final    Lymphocyte % 12/11/2023 36.5  19.6 - 45.3 % Final    Monocyte % 12/11/2023 10.5  5.0 - 12.0 % Final    Eosinophil % 12/11/2023 4.3  0.3 - 6.2 % Final    Basophil % 12/11/2023 0.8  0.0 - 1.5 % Final    Neutrophils, Absolute 12/11/2023 1.79  1.70 - 7.00 10*3/mm3 Final    Lymphocytes, Absolute 12/11/2023 1.36  0.70 - 3.10 10*3/mm3 Final    Monocytes, Absolute 12/11/2023 0.39  0.10 - 0.90 10*3/mm3 Final     Eosinophils, Absolute 12/11/2023 0.16  0.00 - 0.40 10*3/mm3 Final    Basophils, Absolute 12/11/2023 0.03  0.00 - 0.20 10*3/mm3 Final       Mental Status Exam  Hygiene:  good  Dress: casual  Attitude: cooperative and agreeable   Motor Activity: appropriate  Eye Contact:  good  Speech: regular rate and rhythm   Mood:  calm and cooperative  Affect:  Appropriate  Thought Processes:  Linear  Thought Content:  Normal  Suicidal Thoughts:  denies  Homicidal Thoughts:  denies  Crisis Safety Plan: Safety plan has been discussed.   Hallucinations:  Unknown to clinician.   Reliability: fair  Insight: fair  Judgement: fair  Impulse Control: fair    Recovery/spiritual support group attendance: No.      Progress toward goal: Not at goal    Prognosis: Fair with Ongoing Treatment     Self-reported number of days sober: Patient reported 8 days on check in form.     Patient will contact this office, call 911 or present to the nearest emergency room should suicidal or homicidal ideations occur.    Impression/Formulation:    ICD-10-CM ICD-9-CM   1. Opioid use disorder, severe, in sustained remission  F11.21 304.03   2. Alcohol use disorder, severe, in sustained remission  F10.21 303.93   3. Methamphetamine use disorder, severe, dependence  F15.20 304.40   4. Cannabis use disorder, mild, abuse  F12.10 305.20       Clinical Maneuvering/Interventions: Therapist utilized a person-centered approach to build rapport with group member. Therapist implemented motivational interviewing techniques to assist client with exploring and resolving ambivalence associated with commitment to change behaviors related to substance use and addiction. Therapist applied cognitive behavioral strategies to facilitate identification of maladaptive patterns of thinking and behavior that contribute to client's risk for continued substance use and relapse. Therapist employed group interaction activities to build rapport among group members, promote sobriety, and  emphasize relapse prevention. Therapist promoted safe nonjudgmental environment by providing group members with unconditional positive regard and encouraging group members to comply with group rules and guidelines. Therapist assisted group member with identifying and implementing healthier coping strategies.      Plan:  Continue [facility] IOP Phase I   Aftercare:  [facility] Phase II  Program Assignments:  Personal recovery plan, relapse prevention plan, attendance of recovery support group meetings, exploration of sponsorship, drug/alcohol screens.     Octaviano Crawley LCSW  1/9/2024  17:25 EST

## 2024-01-10 ENCOUNTER — OFFICE VISIT (OUTPATIENT)
Dept: PSYCHIATRY | Facility: HOSPITAL | Age: 44
End: 2024-01-10
Payer: COMMERCIAL

## 2024-01-10 DIAGNOSIS — F15.20 METHAMPHETAMINE USE DISORDER, SEVERE, DEPENDENCE: ICD-10-CM

## 2024-01-10 DIAGNOSIS — F10.21 ALCOHOL USE DISORDER, SEVERE, IN SUSTAINED REMISSION: ICD-10-CM

## 2024-01-10 DIAGNOSIS — F12.10 CANNABIS USE DISORDER, MILD, ABUSE: ICD-10-CM

## 2024-01-10 DIAGNOSIS — F11.21 OPIOID USE DISORDER, SEVERE, IN SUSTAINED REMISSION: Primary | ICD-10-CM

## 2024-01-10 PROCEDURE — H0015 ALCOHOL AND/OR DRUG SERVICES: HCPCS | Performed by: NURSE PRACTITIONER

## 2024-01-10 NOTE — PROGRESS NOTES
CD IOP GROUP     Date: 01/04/2024  Name: Nilson Miller    Time In: Patient arrived to group at approximately 06:08 PM.   Time Out: 2058     Number of participants: 11    IOP GROUP NOTE     Data: 3 hour IOP group therapy session (Check-ins, Coping Skills, Relapse Prevention)     Check Ins: Therapist continued facilitation of rapport building strategies between group members. Therapist asked that each patient check in with home life and recovery efforts and identify triggers, cravings, and high risk situations that arise between group sessions. Therapist provided empathy and support during group session.     Session Content/Coping Skills:  Deja and [facility]  Andrew present for first part of session. Check ins completed by group members. Group members participated in a group profile activity where they were asked to work as a group to create a profile to represent the group incorporating concepts learned in CD-IOP. Article “8 types of denial in addiction” reviewed and discussed (retrieved from [email or web address] video “Addiction: Tomorrow is going to be better Kb Armas's Story” viewed ([employer] channel). Discussion questions assigned to group related to video in which group members were asked to identify types of addictive thinking displayed in the story.     Response: Patient attended class in person. Patient participated in completion of check in form. Patient on check in form answered no to question of “currently or since your last group meeting, have you had any suicidal thoughts or plan or intent to hurt yourself”, and patient also answered no on check in form to question of “currently or since your last group meeting, have you had any homicidal thoughts or plan or intent to hurt others”. Patient on check in form reported concerns with sleep since last group meeting.     Personal Assessment 0-10 Scale (0-none,  10-high)    Anxiety:  5   Depression:  0   Cravings: 7     Assessment:     ..  No visits with results within 3 Week(s) from this visit.   Latest known visit with results is:   Lab on 12/11/2023   Component Date Value Ref Range Status    Hepatitis C Quantitation 12/11/2023 HCV Not Detected  IU/mL Final    Test Information 12/11/2023 Comment   Final    The quantitative range of this assay is 15 IU/mL to 100 million IU/mL.    Glucose 12/11/2023 115 (H)  65 - 99 mg/dL Final    BUN 12/11/2023 11  6 - 20 mg/dL Final    Creatinine 12/11/2023 1.04  0.76 - 1.27 mg/dL Final    Sodium 12/11/2023 140  136 - 145 mmol/L Final    Potassium 12/11/2023 4.0  3.5 - 5.2 mmol/L Final    Chloride 12/11/2023 100  98 - 107 mmol/L Final    CO2 12/11/2023 25.0  22.0 - 29.0 mmol/L Final    Calcium 12/11/2023 9.1  8.6 - 10.5 mg/dL Final    Total Protein 12/11/2023 7.8  6.0 - 8.5 g/dL Final    Albumin 12/11/2023 4.5  3.5 - 5.2 g/dL Final    ALT (SGPT) 12/11/2023 34  1 - 41 U/L Final    AST (SGOT) 12/11/2023 34  1 - 40 U/L Final    Alkaline Phosphatase 12/11/2023 59  39 - 117 U/L Final    Total Bilirubin 12/11/2023 0.3  0.0 - 1.2 mg/dL Final    Globulin 12/11/2023 3.3  gm/dL Final    A/G Ratio 12/11/2023 1.4  g/dL Final    BUN/Creatinine Ratio 12/11/2023 10.6  7.0 - 25.0 Final    Anion Gap 12/11/2023 15.0  5.0 - 15.0 mmol/L Final    eGFR 12/11/2023 90.8  >60.0 mL/min/1.73 Final    Protime 12/11/2023 13.3  12.3 - 15.1 Seconds Final    INR 12/11/2023 0.96  0.90 - 1.10 Final    WBC 12/11/2023 3.73  3.40 - 10.80 10*3/mm3 Final    RBC 12/11/2023 4.62  4.14 - 5.80 10*6/mm3 Final    Hemoglobin 12/11/2023 11.4 (L)  13.0 - 17.7 g/dL Final    Hematocrit 12/11/2023 36.4 (L)  37.5 - 51.0 % Final    MCV 12/11/2023 78.8 (L)  79.0 - 97.0 fL Final    MCH 12/11/2023 24.7 (L)  26.6 - 33.0 pg Final    MCHC 12/11/2023 31.3 (L)  31.5 - 35.7 g/dL Final    RDW 12/11/2023 19.4 (H)  12.3 - 15.4 % Final    RDW-SD 12/11/2023 52.9  37.0 - 54.0 fl Final    Platelets  12/11/2023 76 (L)  140 - 450 10*3/mm3 Final    Neutrophil % 12/11/2023 47.9  42.7 - 76.0 % Final    Lymphocyte % 12/11/2023 36.5  19.6 - 45.3 % Final    Monocyte % 12/11/2023 10.5  5.0 - 12.0 % Final    Eosinophil % 12/11/2023 4.3  0.3 - 6.2 % Final    Basophil % 12/11/2023 0.8  0.0 - 1.5 % Final    Neutrophils, Absolute 12/11/2023 1.79  1.70 - 7.00 10*3/mm3 Final    Lymphocytes, Absolute 12/11/2023 1.36  0.70 - 3.10 10*3/mm3 Final    Monocytes, Absolute 12/11/2023 0.39  0.10 - 0.90 10*3/mm3 Final    Eosinophils, Absolute 12/11/2023 0.16  0.00 - 0.40 10*3/mm3 Final    Basophils, Absolute 12/11/2023 0.03  0.00 - 0.20 10*3/mm3 Final       Mental Status Exam  Hygiene:  good  Dress: casual  Attitude: cooperative and agreeable   Motor Activity: appropriate  Eye Contact:  good  Speech: regular rate and rhythm   Mood:  calm and cooperative  Affect:  Appropriate  Thought Processes:  Linear  Thought Content:  Normal  Suicidal Thoughts:  denies  Homicidal Thoughts:  denies  Crisis Safety Plan: Safety plan has been discussed.   Hallucinations:  Unknown to clinician.   Reliability: fair  Insight: fair  Judgement: fair  Impulse Control: fair    Recovery/spiritual support group attendance: No.     Progress toward goal: Not at goal    Prognosis: Fair with Ongoing Treatment     Self-reported number of days sober: Patient reported 9 days on check in form.      Patient will contact this office, call 911 or present to the nearest emergency room should suicidal or homicidal ideations occur.    Impression/Formulation:    ICD-10-CM ICD-9-CM   1. Opioid use disorder, severe, in sustained remission  F11.21 304.03   2. Alcohol use disorder, severe, in sustained remission  F10.21 303.93   3. Methamphetamine use disorder, severe, dependence  F15.20 304.40   4. Cannabis use disorder, mild, abuse  F12.10 305.20       Clinical Maneuvering/Interventions: Therapist utilized a person-centered approach to build rapport with group member. Therapist  implemented motivational interviewing techniques to assist client with exploring and resolving ambivalence associated with commitment to change behaviors related to substance use and addiction. Therapist applied cognitive behavioral strategies to facilitate identification of maladaptive patterns of thinking and behavior that contribute to client's risk for continued substance use and relapse. Therapist employed group interaction activities to build rapport among group members, promote sobriety, and emphasize relapse prevention. Therapist promoted safe nonjudgmental environment by providing group members with unconditional positive regard and encouraging group members to comply with group rules and guidelines. Therapist assisted group member with identifying and implementing healthier coping strategies.      Plan:  Continue [facility] IOP Phase I   Aftercare:  [facility] Phase II  Program Assignments:  Personal recovery plan, relapse prevention plan, attendance of recovery support group meetings, exploration of sponsorship, drug/alcohol screens.     Octaviano Crawley LCSW  1/9/2024  20:11 EST

## 2024-01-11 LAB
1OH-MIDAZOLAM UR CFM-MCNC: NEGATIVE NG/ML
6MAM UR CFM-MCNC: NEGATIVE NG/ML
7AMINOCLONAZEPAM UR CFM-MCNC: NEGATIVE NG/ML
7AMINOCLONAZEPAM UR CFM-MCNC: NEGATIVE NG/ML
A-OH ALPRAZ UR CFM-MCNC: NEGATIVE NG/ML
ALPRAZ UR CFM-MCNC: NEGATIVE NG/ML
AMOBARBITAL UR CFM-MCNC: NEGATIVE NG/ML
AMPHET UR CFM-MCNC: >2500 NG/ML
BUPRENORPHINE UR-MCNC: NEGATIVE NG/ML
BUTABARBITAL UR CFM-MCNC: NEGATIVE NG/ML
BUTALBITAL UR CFM-MCNC: NEGATIVE NG/ML
BZE UR CFM-MCNC: NEGATIVE NG/ML
CARBOXYTHC UR CFM-MCNC: NEGATIVE NG/ML
CARISOPRODOL UR CFM-MCNC: NEGATIVE NG/ML
CODEINE UR CFM-MCNC: NEGATIVE NG/ML
CREATININE: 136.1 MG/DL
DIAZEPAM UR CFM-MCNC: NEGATIVE NG/ML
EDDP UR CFM-MCNC: NEGATIVE NG/ML
ETHYL GLUCURONIDE UR CFM-MCNC: NEGATIVE NG/ML
FENTANYL UR-MCNC: NEGATIVE NG/ML
GABAPENTIN UR CFM-MCNC: NEGATIVE NG/ML
HYDROCODONE UR CFM-MCNC: NEGATIVE NG/ML
HYDROMORPHONE UR CFM-MCNC: NEGATIVE NG/ML
LORAZEPAM UR CFM-MCNC: NEGATIVE NG/ML
MDMA UR CFM-MCNC: NEGATIVE NG/ML
ME-PHENIDATE UR CFM-MCNC: NEGATIVE NG/ML
METHADONE UR CFM-MCNC: NEGATIVE NG/ML
METHAMPHET UR CFM-MCNC: >2000 NG/ML
MIDAZOLAM UR CFM-MCNC: NEGATIVE NG/ML
MORPHINE UR CFM-MCNC: NEGATIVE NG/ML
NORBUPRENORPHINE UR CFM-MCNC: NEGATIVE NG/ML
NORDIAZEPAM UR CFM-MCNC: NEGATIVE NG/ML
NORFENTANYL UR CFM-MCNC: NEGATIVE NG/ML
NORHYDROCODONE UR CFM-MCNC: NEGATIVE NG/ML
NOROXYCODONE UR CFM-MCNC: NEGATIVE NG/ML
OXAZEPAM CTO UR CFM-MCNC: NEGATIVE NG/ML
OXYCODONE SERPL-MCNC: NEGATIVE NG/ML
OXYMORPHONE SERPL-MCNC: NEGATIVE NG/ML
PCP UR CFM-MCNC: NEGATIVE NG/ML
PH: 6.1 (ref 4.5–9)
PHENOBARB UR CFM-MCNC: NEGATIVE NG/ML
PHENTERMINE: NEGATIVE NG/ML
PPAA UR CFM-MCNC: NEGATIVE NG/ML
PREGABALIN UR CFM-MCNC: NEGATIVE NG/ML
SECOBARBITAL UR CFM-MCNC: NEGATIVE NG/ML
SPECIFIC GRAVITY: 1.03 (ref 1–1.03)
TAPENTADOL UR CFM-MCNC: NEGATIVE NG/ML
TEMAZEPAM UR CFM-MCNC: NEGATIVE NG/ML
TRAMADOL: NEGATIVE NG/ML
ZOLPIDEM PHENYL-4-CARB UR CFM-MCNC: NEGATIVE NG/ML
ZOLPIDEM UR CFM-MCNC: NEGATIVE NG/ML

## 2024-01-14 NOTE — PROGRESS NOTES
CD IOP GROUP     Date: 01/08/2024  Name: Nilson Miller    Time In: 1800   Time Out: 2055     Number of participants: 7    IOP GROUP NOTE     Data: 3 hour IOP group therapy session (Check-ins, Coping Skills, Relapse Prevention)     Check Ins: Therapist continued facilitation of rapport building strategies between group members. Therapist asked that each patient check in with home life and recovery efforts and identify triggers, cravings, and high risk situations that arise between group sessions. Therapist provided empathy and support during group session.     Session Content/Coping Skills: JASON Newman student present for session.  Crystal also present for session. Check ins completed by group members. Clinician completed individual check ins with each group member in attendance. CD-IOP Rules sheet reviewed and provided to group members. Clinician explored with group members the values that the feel someone who is in recovery would have. Paty facilitated value card sort activity. Group members were asked to identify their very important, somewhat important, and not important values. The group was broken into smaller groups and group members were assigned discussion questions related to values.     Response: Patient attended class in person. Patient participated in completion of check in form. Patient on check in form answered no to question of “currently or since your last group meeting, have you had any suicidal thoughts or plan or intent to hurt yourself”, and patient also answered no on check in form to question of “currently or since your last group meeting, have you had any homicidal thoughts or plan or intent to hurt others”.     Personal Assessment 0-10 Scale (0-none, 10-high)    Anxiety:  6   Depression:  0   Cravings: 6     Assessment:     ..  Lab on 01/08/2024   Component Date Value Ref Range Status    THC, Screen, Urine 01/08/2024 Negative  Negative Final    Phencyclidine  (PCP), Urine 01/08/2024 Negative  Negative Final    Cocaine Screen, Urine 01/08/2024 Negative  Negative Final    Methamphetamine, Ur 01/08/2024 Positive (A)  Negative Final    Opiate Screen 01/08/2024 Negative  Negative Final    Amphetamine Screen, Urine 01/08/2024 Negative  Negative Final    Benzodiazepine Screen, Urine 01/08/2024 Negative  Negative Final    Tricyclic Antidepressants Screen 01/08/2024 Negative  Negative Final    Methadone Screen, Urine 01/08/2024 Negative  Negative Final    Barbiturates Screen, Urine 01/08/2024 Negative  Negative Final    Oxycodone Screen, Urine 01/08/2024 Negative  Negative Final    Buprenorphine, Screen, Urine 01/08/2024 Negative  Negative Final    PH 01/08/2024 6.1  4.5 - 9 Final    CREATININE 01/08/2024 136.1  20 - 300 mg/dL mg/dL Final    SPECIFIC GRAVITY 01/08/2024 1.026  1.003 - 1.035 Final    CODEINE 01/08/2024 Negative  50 ng/ml ng/ml Final    HYDROCODONE 01/08/2024 Negative  50 ng/ml ng/ml Final    NORHYDROCODONE 01/08/2024 Negative  50 ng/ml ng/ml Final    HYDROMORPHONE 01/08/2024 Negative  50 ng/ml ng/ml Final    MORPHINE 01/08/2024 Negative  50 ng/ml ng/ml Final    FENTANYL 01/08/2024 Negative  2 ng/ml ng/ml Final    NORFENTANYL 01/08/2024 Negative  10 ng/ml ng/ml Final    METHADONE 01/08/2024 Negative  25 ng/ml ng/ml Final    EDDP 01/08/2024 Negative  50 ng/ml ng/ml Final    OXYCODONE 01/08/2024 Negative  50 ng/ml ng/ml Final    NOROXYCODONE 01/08/2024 Negative  50 ng/ml ng/ml Final    OXYMORPHONE 01/08/2024 Negative  50 ng/ml ng/ml Final    TAPENTADOL 01/08/2024 Negative  50 ng/ml ng/ml Final    TRAMADOL 01/08/2024 Negative  50 ng/ml ng/ml Final    BUPRENORPHINE 01/08/2024 Negative  7.5 ng/ml ng/ml Final    NORBUPRENORPHINE 01/08/2024 Negative  10 ng/ml ng/ml Final    AMOBARBITAL 01/08/2024 Negative  100 ng/ml ng/ml Final    BUTABARBITAL 01/08/2024 Negative  100 ng/ml ng/ml Final    BUTALBITAL 01/08/2024 Negative  100 ng/ml ng/ml Final    PHENOBARBITAL 01/08/2024  Negative  100 ng/ml ng/ml Final    SECOBARBITAL 01/08/2024 Negative  100 ng/ml ng/ml Final    ALPRAZOLAM 01/08/2024 Negative  50 ng/ml ng/ml Final    ALPHA-HYDROXYALPRAZOLAM 01/08/2024 Negative  50 ng/ml ng/ml Final    CLONAZEPAM 01/08/2024 Negative  50 ng/ml ng/ml Final    7- AMINOCLONAZEPAM 01/08/2024 Negative  50 ng/ml ng/ml Final    DIAZEPAM 01/08/2024 Negative  50 ng/ml ng/ml Final    NORDIAZEPAM 01/08/2024 Negative  50 ng/ml ng/ml Final    LORAZEPAM 01/08/2024 Negative  100 ng/ml ng/ml Final    MIDAZOLAM 01/08/2024 Negative  50 ng/ml ng/ml Final    ALPHA-HYDROXYMIDAZOLAM 01/08/2024 Negative  50 ng/ml ng/ml Final    OXAZEPAM 01/08/2024 Negative  50 ng/ml ng/ml Final    TEMAZEPAM 01/08/2024 Negative  50 ng/ml ng/ml Final    ETG 01/08/2024 Negative  200 ng/ml ng/ml Final    BENZOYLECGONINE 01/08/2024 Negative  100 ng/ml ng/ml Final    6-DEJUAN 01/08/2024 Negative  10 ng/ml ng/ml Final    MDMA 01/08/2024 Negative  100 ng/ml ng/ml Final    PCP 01/08/2024 Negative  20 ng/ml ng/ml Final    DELTA-9-THC 01/08/2024 Negative  20 ng/ml ng/ml Final    AMPHETAMINE 01/08/2024 >2500 (C)  250 ng/ml ng/ml Final    METHAMPHETAMINE 01/08/2024 >2000 (C)  100 ng/ml ng/ml Final    METHYLPHENIDATE 01/08/2024 Negative  10 ng/ml ng/ml Final    PHENTERMINE 01/08/2024 Negative  100 ng/ml ng/ml Final    RITALINIC ACID 01/08/2024 Negative  50 ng/ml ng/ml Final    CARISOPRODOL 01/08/2024 Negative  100 ng/ml ng/ml Final    GABAPENTIN 01/08/2024 Negative  500 ng/ml ng/ml Final    PREGABALIN 01/08/2024 Negative  250 ng/ml ng/ml Final    ZOLPIDEM 01/08/2024 Negative  2 ng/ml ng/ml Final    CARBOXYZOLPIDEM 01/08/2024 Negative  10 ng/ml ng/ml Final       Mental Status Exam  Hygiene:  good  Dress: casual  Attitude: cooperative and agreeable   Motor Activity: appropriate  Eye Contact:  good  Speech: regular rate and rhythm   Mood:  calm and cooperative  Affect:  Appropriate  Thought Processes:  Linear  Thought Content:  Normal  Suicidal Thoughts:   denies  Homicidal Thoughts:  denies  Crisis Safety Plan: Safety plan has been discussed.   Hallucinations:  Unknown to clinician.   Reliability: fair  Insight: fair  Judgement: fair  Impulse Control: fair    Recovery/spiritual support group attendance: No.      Progress toward goal: Not at goal    Prognosis: Fair with Ongoing Treatment     Self-reported number of days sober: Patient initially reported no use since last group but wrote on check in form that he used meth Saturday. Patient denied use around child.     Patient will contact this office, call 911 or present to the nearest emergency room should suicidal or homicidal ideations occur.    Impression/Formulation:    ICD-10-CM ICD-9-CM   1. Opioid use disorder, severe, in sustained remission  F11.21 304.03   2. Alcohol use disorder, severe, in sustained remission  F10.21 303.93   3. Methamphetamine use disorder, severe, dependence  F15.20 304.40   4. Cannabis use disorder, mild, abuse  F12.10 305.20       Clinical Maneuvering/Interventions: Therapist utilized a person-centered approach to build rapport with group member. Therapist implemented motivational interviewing techniques to assist client with exploring and resolving ambivalence associated with commitment to change behaviors related to substance use and addiction. Therapist applied cognitive behavioral strategies to facilitate identification of maladaptive patterns of thinking and behavior that contribute to client's risk for continued substance use and relapse. Therapist employed group interaction activities to build rapport among group members, promote sobriety, and emphasize relapse prevention. Therapist promoted safe nonjudgmental environment by providing group members with unconditional positive regard and encouraging group members to comply with group rules and guidelines. Therapist assisted group member with identifying and implementing healthier coping strategies.      Plan:  Continue [facility]  Behavioral [facility] Phase I   Aftercare:  [facility] Phase II  Program Assignments:  Personal recovery plan, relapse prevention plan, attendance of recovery support group meetings, exploration of sponsorship, drug/alcohol screens.     Octaviano Crawley LCSW  1/14/2024  17:10 EST

## 2024-01-14 NOTE — PROGRESS NOTES
[facility] IOP GROUP     Date: 01/10/2024  Name: Nilson Miller    Time In: Patient arrived to group at approximately 06:07 PM.   Time Out: Class was dismissed at approximately 08:30 PM so that individual check in meetings could be held with other group members.      Number of participants: 7    IOP GROUP NOTE     Data: 3 hour IOP group therapy session (Check-ins, Coping Skills, Relapse Prevention)     Check Ins: Therapist continued facilitation of rapport building strategies between group members. Therapist asked that each patient check in with home life and recovery efforts and identify triggers, cravings, and high risk situations that arise between group sessions. Therapist provided empathy and support during group session.     Session Content/Coping Skills: , Deja, attended for first part of session. Andrew,  with [facility] also attended for first part of session. Clinician discussed with group members focusing on their recovery. Check ins completed by group members. , Deja, facilitated reading from Alcoholics Anonymous “My bottle, My Sentiments, and Me” (p.[phone]). Group members participated in reading from passage. The reading was discussed and processed with group. Group members participated in back-to-back drawing activity (Therapist Aid), in which group members were asked to describe a picture for the other group members to draw. Following activity, clinician processed activity with the group and explored communication and listening skills displayed in activity.     Response: Patient attended class in person. Patient participated in completion of check in form. Patient on check in form answered no to question of “currently or since your last group meeting, have you had any suicidal thoughts or plan or intent to hurt yourself”, and patient also answered no on check in form to question of “currently or since your  last group meeting, have you had any homicidal thoughts or plan or intent to hurt others”.     Personal Assessment 0-10 Scale (0-none, 10-high)    Anxiety:  5   Depression:  0   Cravings: 5     Assessment:     ..  Lab on 01/08/2024   Component Date Value Ref Range Status    THC, Screen, Urine 01/08/2024 Negative  Negative Final    Phencyclidine (PCP), Urine 01/08/2024 Negative  Negative Final    Cocaine Screen, Urine 01/08/2024 Negative  Negative Final    Methamphetamine, Ur 01/08/2024 Positive (A)  Negative Final    Opiate Screen 01/08/2024 Negative  Negative Final    Amphetamine Screen, Urine 01/08/2024 Negative  Negative Final    Benzodiazepine Screen, Urine 01/08/2024 Negative  Negative Final    Tricyclic Antidepressants Screen 01/08/2024 Negative  Negative Final    Methadone Screen, Urine 01/08/2024 Negative  Negative Final    Barbiturates Screen, Urine 01/08/2024 Negative  Negative Final    Oxycodone Screen, Urine 01/08/2024 Negative  Negative Final    Buprenorphine, Screen, Urine 01/08/2024 Negative  Negative Final    PH 01/08/2024 6.1  4.5 - 9 Final    CREATININE 01/08/2024 136.1  20 - 300 mg/dL mg/dL Final    SPECIFIC GRAVITY 01/08/2024 1.026  1.003 - 1.035 Final    CODEINE 01/08/2024 Negative  50 ng/ml ng/ml Final    HYDROCODONE 01/08/2024 Negative  50 ng/ml ng/ml Final    NORHYDROCODONE 01/08/2024 Negative  50 ng/ml ng/ml Final    HYDROMORPHONE 01/08/2024 Negative  50 ng/ml ng/ml Final    MORPHINE 01/08/2024 Negative  50 ng/ml ng/ml Final    FENTANYL 01/08/2024 Negative  2 ng/ml ng/ml Final    NORFENTANYL 01/08/2024 Negative  10 ng/ml ng/ml Final    METHADONE 01/08/2024 Negative  25 ng/ml ng/ml Final    EDDP 01/08/2024 Negative  50 ng/ml ng/ml Final    OXYCODONE 01/08/2024 Negative  50 ng/ml ng/ml Final    NOROXYCODONE 01/08/2024 Negative  50 ng/ml ng/ml Final    OXYMORPHONE 01/08/2024 Negative  50 ng/ml ng/ml Final    TAPENTADOL 01/08/2024 Negative  50 ng/ml ng/ml Final    TRAMADOL 01/08/2024 Negative   50 ng/ml ng/ml Final    BUPRENORPHINE 01/08/2024 Negative  7.5 ng/ml ng/ml Final    NORBUPRENORPHINE 01/08/2024 Negative  10 ng/ml ng/ml Final    AMOBARBITAL 01/08/2024 Negative  100 ng/ml ng/ml Final    BUTABARBITAL 01/08/2024 Negative  100 ng/ml ng/ml Final    BUTALBITAL 01/08/2024 Negative  100 ng/ml ng/ml Final    PHENOBARBITAL 01/08/2024 Negative  100 ng/ml ng/ml Final    SECOBARBITAL 01/08/2024 Negative  100 ng/ml ng/ml Final    ALPRAZOLAM 01/08/2024 Negative  50 ng/ml ng/ml Final    ALPHA-HYDROXYALPRAZOLAM 01/08/2024 Negative  50 ng/ml ng/ml Final    CLONAZEPAM 01/08/2024 Negative  50 ng/ml ng/ml Final    7- AMINOCLONAZEPAM 01/08/2024 Negative  50 ng/ml ng/ml Final    DIAZEPAM 01/08/2024 Negative  50 ng/ml ng/ml Final    NORDIAZEPAM 01/08/2024 Negative  50 ng/ml ng/ml Final    LORAZEPAM 01/08/2024 Negative  100 ng/ml ng/ml Final    MIDAZOLAM 01/08/2024 Negative  50 ng/ml ng/ml Final    ALPHA-HYDROXYMIDAZOLAM 01/08/2024 Negative  50 ng/ml ng/ml Final    OXAZEPAM 01/08/2024 Negative  50 ng/ml ng/ml Final    TEMAZEPAM 01/08/2024 Negative  50 ng/ml ng/ml Final    ETG 01/08/2024 Negative  200 ng/ml ng/ml Final    BENZOYLECGONINE 01/08/2024 Negative  100 ng/ml ng/ml Final    6-DEJUAN 01/08/2024 Negative  10 ng/ml ng/ml Final    MDMA 01/08/2024 Negative  100 ng/ml ng/ml Final    PCP 01/08/2024 Negative  20 ng/ml ng/ml Final    DELTA-9-THC 01/08/2024 Negative  20 ng/ml ng/ml Final    AMPHETAMINE 01/08/2024 >2500 (C)  250 ng/ml ng/ml Final    METHAMPHETAMINE 01/08/2024 >2000 (C)  100 ng/ml ng/ml Final    METHYLPHENIDATE 01/08/2024 Negative  10 ng/ml ng/ml Final    PHENTERMINE 01/08/2024 Negative  100 ng/ml ng/ml Final    RITALINIC ACID 01/08/2024 Negative  50 ng/ml ng/ml Final    CARISOPRODOL 01/08/2024 Negative  100 ng/ml ng/ml Final    GABAPENTIN 01/08/2024 Negative  500 ng/ml ng/ml Final    PREGABALIN 01/08/2024 Negative  250 ng/ml ng/ml Final    ZOLPIDEM 01/08/2024 Negative  2 ng/ml ng/ml Final    CARBOXYZOLPIDEM  01/08/2024 Negative  10 ng/ml ng/ml Final       Mental Status Exam  Hygiene:  good  Dress: casual  Attitude: cooperative and agreeable   Motor Activity: appropriate  Eye Contact:  good  Speech: regular rate and rhythm   Mood:  calm and cooperative  Affect:  Appropriate  Thought Processes:  Linear  Thought Content:  Normal  Suicidal Thoughts:  denies  Homicidal Thoughts:  denies  Crisis Safety Plan: Safety plan has been discussed.   Hallucinations:  Unknown to clinician.   Reliability: fair  Insight: fair  Judgement: fair  Impulse Control: fair    Recovery/spiritual support group attendance: No.      Progress toward goal: Not at goal    Prognosis: Fair with Ongoing Treatment     Self-reported number of days sober: Patient reported 5 on check in form.     Patient will contact this office, call 911 or present to the nearest emergency room should suicidal or homicidal ideations occur.    Impression/Formulation:    ICD-10-CM ICD-9-CM   1. Opioid use disorder, severe, in sustained remission  F11.21 304.03   2. Alcohol use disorder, severe, in sustained remission  F10.21 303.93   3. Methamphetamine use disorder, severe, dependence  F15.20 304.40   4. Cannabis use disorder, mild, abuse  F12.10 305.20       Clinical Maneuvering/Interventions: Therapist utilized a person-centered approach to build rapport with group member. Therapist implemented motivational interviewing techniques to assist client with exploring and resolving ambivalence associated with commitment to change behaviors related to substance use and addiction. Therapist applied cognitive behavioral strategies to facilitate identification of maladaptive patterns of thinking and behavior that contribute to client's risk for continued substance use and relapse. Therapist employed group interaction activities to build rapport among group members, promote sobriety, and emphasize relapse prevention. Therapist promoted safe nonjudgmental environment by providing group  members with unconditional positive regard and encouraging group members to comply with group rules and guidelines. Therapist assisted group member with identifying and implementing healthier coping strategies.      Plan:  Continue [facility] IOP Phase I   Aftercare:  [facility] Phase II  Program Assignments:  Personal recovery plan, relapse prevention plan, attendance of recovery support group meetings, exploration of sponsorship, drug/alcohol screens.     Octaviano Crawley LCSW  1/14/2024  18:43 EST

## 2024-01-17 ENCOUNTER — OFFICE VISIT (OUTPATIENT)
Dept: PSYCHIATRY | Facility: HOSPITAL | Age: 44
End: 2024-01-17
Payer: COMMERCIAL

## 2024-01-17 DIAGNOSIS — F12.10 CANNABIS USE DISORDER, MILD, ABUSE: ICD-10-CM

## 2024-01-17 DIAGNOSIS — F10.21 ALCOHOL USE DISORDER, SEVERE, IN SUSTAINED REMISSION: ICD-10-CM

## 2024-01-17 DIAGNOSIS — F15.20 METHAMPHETAMINE USE DISORDER, SEVERE, DEPENDENCE: ICD-10-CM

## 2024-01-17 DIAGNOSIS — F11.21 OPIOID USE DISORDER, SEVERE, IN SUSTAINED REMISSION: Primary | ICD-10-CM

## 2024-01-17 PROCEDURE — H0015 ALCOHOL AND/OR DRUG SERVICES: HCPCS | Performed by: NURSE PRACTITIONER

## 2024-01-18 NOTE — PROGRESS NOTES
CD IOP GROUP     Date: 01/17/2024  Name: Nilson Miller    Time In: 1800   Time Out: 2059     Number of participants: 9    IOP GROUP NOTE     Data: 3 hour IOP group therapy session (Check-ins, Coping Skills, Relapse Prevention)     Check Ins: Therapist continued facilitation of rapport building strategies between group members. Therapist asked that each patient check in with home life and recovery efforts and identify triggers, cravings, and high risk situations that arise between group sessions. Therapist provided empathy and support during group session.     Session Content/Coping Skills: [facility] student, attended for first part of session. Andrew [facility]  also attended for first part of session. Check ins completed by group members. IOP Rules sheet was reviewed with group members and clinician allowed opportunity for group to ask any questions they had about the rules sheet. Initial treatment plans were reviewed and signed by group members. Clinician completed individual check ins with each group member. Galen Luque's “Inside Out” film finished. Clinician discussed with group members the importance of emotions and the importance of managing emotions. Clinician explored with group members their suppression of emotions with drugs and alcohol. Clinician also processed with group members the feeling of emotions while in recovery. Therapist Aid “Anger Management Skills” psychoeducational material reviewed and discussed with group members.     Response: Patient attended class in person. Patient participated in completion of check in form. Patient on check in form answered no to question of “currently or since your last group meeting, have you had any suicidal thoughts or plan or intent to hurt yourself”, and patient also answered no on check in form to question of “currently or since your last group meeting, have you had any homicidal thoughts or plan or intent to hurt  others”. Patient during individual check in denied suicidal or homicidal thoughts. Patient on check in form reported concerns with sleep since last group meeting.     Personal Assessment 0-10 Scale (0-none, 10-high)    Anxiety:  3   Depression:  0   Cravings: 5     Assessment:     ..  Lab on 01/08/2024   Component Date Value Ref Range Status    THC, Screen, Urine 01/08/2024 Negative  Negative Final    Phencyclidine (PCP), Urine 01/08/2024 Negative  Negative Final    Cocaine Screen, Urine 01/08/2024 Negative  Negative Final    Methamphetamine, Ur 01/08/2024 Positive (A)  Negative Final    Opiate Screen 01/08/2024 Negative  Negative Final    Amphetamine Screen, Urine 01/08/2024 Negative  Negative Final    Benzodiazepine Screen, Urine 01/08/2024 Negative  Negative Final    Tricyclic Antidepressants Screen 01/08/2024 Negative  Negative Final    Methadone Screen, Urine 01/08/2024 Negative  Negative Final    Barbiturates Screen, Urine 01/08/2024 Negative  Negative Final    Oxycodone Screen, Urine 01/08/2024 Negative  Negative Final    Buprenorphine, Screen, Urine 01/08/2024 Negative  Negative Final    PH 01/08/2024 6.1  4.5 - 9 Final    CREATININE 01/08/2024 136.1  20 - 300 mg/dL mg/dL Final    SPECIFIC GRAVITY 01/08/2024 1.026  1.003 - 1.035 Final    CODEINE 01/08/2024 Negative  50 ng/ml ng/ml Final    HYDROCODONE 01/08/2024 Negative  50 ng/ml ng/ml Final    NORHYDROCODONE 01/08/2024 Negative  50 ng/ml ng/ml Final    HYDROMORPHONE 01/08/2024 Negative  50 ng/ml ng/ml Final    MORPHINE 01/08/2024 Negative  50 ng/ml ng/ml Final    FENTANYL 01/08/2024 Negative  2 ng/ml ng/ml Final    NORFENTANYL 01/08/2024 Negative  10 ng/ml ng/ml Final    METHADONE 01/08/2024 Negative  25 ng/ml ng/ml Final    EDDP 01/08/2024 Negative  50 ng/ml ng/ml Final    OXYCODONE 01/08/2024 Negative  50 ng/ml ng/ml Final    NOROXYCODONE 01/08/2024 Negative  50 ng/ml ng/ml Final    OXYMORPHONE 01/08/2024 Negative  50 ng/ml ng/ml Final    TAPENTADOL  01/08/2024 Negative  50 ng/ml ng/ml Final    TRAMADOL 01/08/2024 Negative  50 ng/ml ng/ml Final    BUPRENORPHINE 01/08/2024 Negative  7.5 ng/ml ng/ml Final    NORBUPRENORPHINE 01/08/2024 Negative  10 ng/ml ng/ml Final    AMOBARBITAL 01/08/2024 Negative  100 ng/ml ng/ml Final    BUTABARBITAL 01/08/2024 Negative  100 ng/ml ng/ml Final    BUTALBITAL 01/08/2024 Negative  100 ng/ml ng/ml Final    PHENOBARBITAL 01/08/2024 Negative  100 ng/ml ng/ml Final    SECOBARBITAL 01/08/2024 Negative  100 ng/ml ng/ml Final    ALPRAZOLAM 01/08/2024 Negative  50 ng/ml ng/ml Final    ALPHA-HYDROXYALPRAZOLAM 01/08/2024 Negative  50 ng/ml ng/ml Final    CLONAZEPAM 01/08/2024 Negative  50 ng/ml ng/ml Final    7- AMINOCLONAZEPAM 01/08/2024 Negative  50 ng/ml ng/ml Final    DIAZEPAM 01/08/2024 Negative  50 ng/ml ng/ml Final    NORDIAZEPAM 01/08/2024 Negative  50 ng/ml ng/ml Final    LORAZEPAM 01/08/2024 Negative  100 ng/ml ng/ml Final    MIDAZOLAM 01/08/2024 Negative  50 ng/ml ng/ml Final    ALPHA-HYDROXYMIDAZOLAM 01/08/2024 Negative  50 ng/ml ng/ml Final    OXAZEPAM 01/08/2024 Negative  50 ng/ml ng/ml Final    TEMAZEPAM 01/08/2024 Negative  50 ng/ml ng/ml Final    ETG 01/08/2024 Negative  200 ng/ml ng/ml Final    BENZOYLECGONINE 01/08/2024 Negative  100 ng/ml ng/ml Final    6-DEJUAN 01/08/2024 Negative  10 ng/ml ng/ml Final    MDMA 01/08/2024 Negative  100 ng/ml ng/ml Final    PCP 01/08/2024 Negative  20 ng/ml ng/ml Final    DELTA-9-THC 01/08/2024 Negative  20 ng/ml ng/ml Final    AMPHETAMINE 01/08/2024 >2500 (C)  250 ng/ml ng/ml Final    METHAMPHETAMINE 01/08/2024 >2000 (C)  100 ng/ml ng/ml Final    METHYLPHENIDATE 01/08/2024 Negative  10 ng/ml ng/ml Final    PHENTERMINE 01/08/2024 Negative  100 ng/ml ng/ml Final    RITALINIC ACID 01/08/2024 Negative  50 ng/ml ng/ml Final    CARISOPRODOL 01/08/2024 Negative  100 ng/ml ng/ml Final    GABAPENTIN 01/08/2024 Negative  500 ng/ml ng/ml Final    PREGABALIN 01/08/2024 Negative  250 ng/ml ng/ml Final     ZOLPIDEM 01/08/2024 Negative  2 ng/ml ng/ml Final    CARBOXYZOLPIDEM 01/08/2024 Negative  10 ng/ml ng/ml Final       Mental Status Exam  Hygiene:  good  Dress: casual  Attitude: cooperative and agreeable   Motor Activity: appropriate  Eye Contact:  good  Speech: regular rate and rhythm   Mood:  calm and cooperative  Affect:  Appropriate  Thought Processes:  Linear  Thought Content:  Normal  Suicidal Thoughts:  denies  Homicidal Thoughts:  denies  Crisis Safety Plan: Safety plan has been discussed.   Hallucinations:  Unknown to clinician.   Reliability: fair  Insight: fair  Judgement: fair  Impulse Control: fair    Recovery/spiritual support group attendance: No.     Progress toward goal: Not at goal    Prognosis: Fair with Ongoing Treatment     Self-reported number of days sober: Patient reported 9 days on check in form.     Patient will contact this office, call 911 or present to the nearest emergency room should suicidal or homicidal ideations occur.    Impression/Formulation:    ICD-10-CM ICD-9-CM   1. Opioid use disorder, severe, in sustained remission  F11.21 304.03   2. Alcohol use disorder, severe, in sustained remission  F10.21 303.93   3. Methamphetamine use disorder, severe, dependence  F15.20 304.40   4. Cannabis use disorder, mild, abuse  F12.10 305.20       Clinical Maneuvering/Interventions: Therapist utilized a person-centered approach to build rapport with group member. Therapist implemented motivational interviewing techniques to assist client with exploring and resolving ambivalence associated with commitment to change behaviors related to substance use and addiction. Therapist applied cognitive behavioral strategies to facilitate identification of maladaptive patterns of thinking and behavior that contribute to client's risk for continued substance use and relapse. Therapist employed group interaction activities to build rapport among group members, promote sobriety, and emphasize relapse  prevention. Therapist promoted safe nonjudgmental environment by providing group members with unconditional positive regard and encouraging group members to comply with group rules and guidelines. Therapist assisted group member with identifying and implementing healthier coping strategies.      Plan:  Continue [facility] IOP Phase I   Aftercare:  [facility] Phase II  Program Assignments:  Personal recovery plan, relapse prevention plan, attendance of recovery support group meetings, exploration of sponsorship, drug/alcohol screens.     Octaviano Crawley LCSW  1/18/2024  15:06 EST

## 2024-01-22 ENCOUNTER — OFFICE VISIT (OUTPATIENT)
Dept: PSYCHIATRY | Facility: HOSPITAL | Age: 44
End: 2024-01-22
Payer: COMMERCIAL

## 2024-01-22 ENCOUNTER — TRANSCRIBE ORDERS (OUTPATIENT)
Dept: LAB | Facility: HOSPITAL | Age: 44
End: 2024-01-22
Payer: COMMERCIAL

## 2024-01-22 ENCOUNTER — LAB (OUTPATIENT)
Dept: LAB | Facility: HOSPITAL | Age: 44
End: 2024-01-22
Payer: COMMERCIAL

## 2024-01-22 DIAGNOSIS — B18.2 CHRONIC HEPATITIS C WITH HEPATIC COMA: Primary | ICD-10-CM

## 2024-01-22 DIAGNOSIS — B18.2 CHRONIC HEPATITIS C WITH HEPATIC COMA: ICD-10-CM

## 2024-01-22 DIAGNOSIS — F11.21 OPIOID USE DISORDER, SEVERE, IN SUSTAINED REMISSION: Primary | ICD-10-CM

## 2024-01-22 DIAGNOSIS — F15.20 METHAMPHETAMINE USE DISORDER, SEVERE, DEPENDENCE: ICD-10-CM

## 2024-01-22 DIAGNOSIS — F10.21 ALCOHOL USE DISORDER, SEVERE, IN SUSTAINED REMISSION: ICD-10-CM

## 2024-01-22 DIAGNOSIS — F12.10 CANNABIS USE DISORDER, MILD, ABUSE: ICD-10-CM

## 2024-01-22 LAB
INR PPP: 0.92 (ref 0.9–1.1)
PROTHROMBIN TIME: 12.8 SECONDS (ref 12.3–15.1)

## 2024-01-22 PROCEDURE — 85025 COMPLETE CBC W/AUTO DIFF WBC: CPT

## 2024-01-22 PROCEDURE — H0015 ALCOHOL AND/OR DRUG SERVICES: HCPCS | Performed by: NURSE PRACTITIONER

## 2024-01-22 PROCEDURE — 36415 COLL VENOUS BLD VENIPUNCTURE: CPT

## 2024-01-22 PROCEDURE — 85610 PROTHROMBIN TIME: CPT

## 2024-01-22 PROCEDURE — 80053 COMPREHEN METABOLIC PANEL: CPT

## 2024-01-22 PROCEDURE — 87522 HEPATITIS C REVRS TRNSCRPJ: CPT

## 2024-01-23 DIAGNOSIS — F41.1 GENERALIZED ANXIETY DISORDER: ICD-10-CM

## 2024-01-23 LAB
ALBUMIN SERPL-MCNC: 4.5 G/DL (ref 3.5–5.2)
ALBUMIN/GLOB SERPL: 1.3 G/DL
ALP SERPL-CCNC: 62 U/L (ref 39–117)
ALT SERPL W P-5'-P-CCNC: 31 U/L (ref 1–41)
ANION GAP SERPL CALCULATED.3IONS-SCNC: 10 MMOL/L (ref 5–15)
AST SERPL-CCNC: 27 U/L (ref 1–40)
BILIRUB SERPL-MCNC: 0.3 MG/DL (ref 0–1.2)
BUN SERPL-MCNC: 14 MG/DL (ref 6–20)
BUN/CREAT SERPL: 13.7 (ref 7–25)
CALCIUM SPEC-SCNC: 9 MG/DL (ref 8.6–10.5)
CHLORIDE SERPL-SCNC: 103 MMOL/L (ref 98–107)
CO2 SERPL-SCNC: 24 MMOL/L (ref 22–29)
CREAT SERPL-MCNC: 1.02 MG/DL (ref 0.76–1.27)
DEPRECATED RDW RBC AUTO: 50 FL (ref 37–54)
EGFRCR SERPLBLD CKD-EPI 2021: 92.9 ML/MIN/1.73
ERYTHROCYTE [DISTWIDTH] IN BLOOD BY AUTOMATED COUNT: 17.6 % (ref 12.3–15.4)
GLOBULIN UR ELPH-MCNC: 3.5 GM/DL
GLUCOSE SERPL-MCNC: 119 MG/DL (ref 65–99)
HCT VFR BLD AUTO: 41.6 % (ref 37.5–51)
HGB BLD-MCNC: 13.2 G/DL (ref 13–17.7)
MCH RBC QN AUTO: 25.2 PG (ref 26.6–33)
MCHC RBC AUTO-ENTMCNC: 31.7 G/DL (ref 31.5–35.7)
MCV RBC AUTO: 79.5 FL (ref 79–97)
PLATELET # BLD AUTO: 85 10*3/MM3 (ref 140–450)
PMV BLD AUTO: ABNORMAL FL
POTASSIUM SERPL-SCNC: 4.4 MMOL/L (ref 3.5–5.2)
PROT SERPL-MCNC: 8 G/DL (ref 6–8.5)
RBC # BLD AUTO: 5.23 10*6/MM3 (ref 4.14–5.8)
SODIUM SERPL-SCNC: 137 MMOL/L (ref 136–145)
WBC NRBC COR # BLD AUTO: 5.1 10*3/MM3 (ref 3.4–10.8)

## 2024-01-23 RX ORDER — ESCITALOPRAM OXALATE 5 MG/1
5 TABLET ORAL DAILY
Qty: 30 TABLET | Refills: 0 | Status: SHIPPED | OUTPATIENT
Start: 2024-01-23

## 2024-01-24 LAB
HCV RNA SERPL NAA+PROBE-ACNC: NORMAL IU/ML
TEST INFORMATION: NORMAL

## 2024-01-24 NOTE — PROGRESS NOTES
CD IOP GROUP     Date: 01/22/2024  Name: Nilson Miller    Time In: 1800   Time Out: 2047     Number of participants: 8    IOP GROUP NOTE     Data: 3 hour IOP group therapy session (Check-ins, Coping Skills, Relapse Prevention)     Check Ins: Therapist continued facilitation of rapport building strategies between group members. Therapist asked that each patient check in with home life and recovery efforts and identify triggers, cravings, and high risk situations that arise between group sessions. Therapist provided empathy and support during group session.     Session Content/Coping Skills: J[employer] Student attended session. Andrew [facility] Liaison, and Deja  joined for first part of session. Check ins completed by group members.  shared Just for Today reading and reading was discussed with group members. Clinician explored with group members learning from challenges. A discussion regarding battling stigma was facilitated by clinician. Group members participated in an affirmation activity. Cravings, Basic Principles and Coping with Cravings- Skills List psychoeducational material reviewed and discussed.     Response: Patient attended class in person. Patient participated in completion of check in form. Patient on check in form answered no to question of “currently or since your last group meeting, have you had any suicidal thoughts or plan or intent to hurt yourself”, and patient also answered no on check in form to question of “currently or since your last group meeting, have you had any homicidal thoughts or plan or intent to hurt others”. Patient on check in form reported concerns with sleep since last group meeting.     Personal Assessment 0-10 Scale (0-none, 10-high)    Anxiety:  3   Depression:  0   Cravings: 5     Assessment:     ..  Lab on 01/22/2024   Component Date Value Ref Range Status    Glucose 01/22/2024  119 (H)  65 - 99 mg/dL Final    BUN 01/22/2024 14  6 - 20 mg/dL Final    Creatinine 01/22/2024 1.02  0.76 - 1.27 mg/dL Final    Sodium 01/22/2024 137  136 - 145 mmol/L Final    Potassium 01/22/2024 4.4  3.5 - 5.2 mmol/L Final    Chloride 01/22/2024 103  98 - 107 mmol/L Final    CO2 01/22/2024 24.0  22.0 - 29.0 mmol/L Final    Calcium 01/22/2024 9.0  8.6 - 10.5 mg/dL Final    Total Protein 01/22/2024 8.0  6.0 - 8.5 g/dL Final    Albumin 01/22/2024 4.5  3.5 - 5.2 g/dL Final    ALT (SGPT) 01/22/2024 31  1 - 41 U/L Final    AST (SGOT) 01/22/2024 27  1 - 40 U/L Final    Alkaline Phosphatase 01/22/2024 62  39 - 117 U/L Final    Total Bilirubin 01/22/2024 0.3  0.0 - 1.2 mg/dL Final    Globulin 01/22/2024 3.5  gm/dL Final    A/G Ratio 01/22/2024 1.3  g/dL Final    BUN/Creatinine Ratio 01/22/2024 13.7  7.0 - 25.0 Final    Anion Gap 01/22/2024 10.0  5.0 - 15.0 mmol/L Final    eGFR 01/22/2024 92.9  >60.0 mL/min/1.73 Final    Protime 01/22/2024 12.8  12.3 - 15.1 Seconds Final    INR 01/22/2024 0.92  0.90 - 1.10 Final    WBC 01/22/2024 5.10  3.40 - 10.80 10*3/mm3 Final    RBC 01/22/2024 5.23  4.14 - 5.80 10*6/mm3 Final    Hemoglobin 01/22/2024 13.2  13.0 - 17.7 g/dL Final    Hematocrit 01/22/2024 41.6  37.5 - 51.0 % Final    MCV 01/22/2024 79.5  79.0 - 97.0 fL Final    MCH 01/22/2024 25.2 (L)  26.6 - 33.0 pg Final    MCHC 01/22/2024 31.7  31.5 - 35.7 g/dL Final    RDW 01/22/2024 17.6 (H)  12.3 - 15.4 % Final    RDW-SD 01/22/2024 50.0  37.0 - 54.0 fl Final    MPV 01/22/2024    Final    Unable to calculate    Platelets 01/22/2024 85 (L)  140 - 450 10*3/mm3 Final   Lab on 01/08/2024   Component Date Value Ref Range Status    THC, Screen, Urine 01/08/2024 Negative  Negative Final    Phencyclidine (PCP), Urine 01/08/2024 Negative  Negative Final    Cocaine Screen, Urine 01/08/2024 Negative  Negative Final    Methamphetamine, Ur 01/08/2024 Positive (A)  Negative Final    Opiate Screen 01/08/2024 Negative  Negative Final    Amphetamine  Screen, Urine 01/08/2024 Negative  Negative Final    Benzodiazepine Screen, Urine 01/08/2024 Negative  Negative Final    Tricyclic Antidepressants Screen 01/08/2024 Negative  Negative Final    Methadone Screen, Urine 01/08/2024 Negative  Negative Final    Barbiturates Screen, Urine 01/08/2024 Negative  Negative Final    Oxycodone Screen, Urine 01/08/2024 Negative  Negative Final    Buprenorphine, Screen, Urine 01/08/2024 Negative  Negative Final    PH 01/08/2024 6.1  4.5 - 9 Final    CREATININE 01/08/2024 136.1  20 - 300 mg/dL mg/dL Final    SPECIFIC GRAVITY 01/08/2024 1.026  1.003 - 1.035 Final    CODEINE 01/08/2024 Negative  50 ng/ml ng/ml Final    HYDROCODONE 01/08/2024 Negative  50 ng/ml ng/ml Final    NORHYDROCODONE 01/08/2024 Negative  50 ng/ml ng/ml Final    HYDROMORPHONE 01/08/2024 Negative  50 ng/ml ng/ml Final    MORPHINE 01/08/2024 Negative  50 ng/ml ng/ml Final    FENTANYL 01/08/2024 Negative  2 ng/ml ng/ml Final    NORFENTANYL 01/08/2024 Negative  10 ng/ml ng/ml Final    METHADONE 01/08/2024 Negative  25 ng/ml ng/ml Final    EDDP 01/08/2024 Negative  50 ng/ml ng/ml Final    OXYCODONE 01/08/2024 Negative  50 ng/ml ng/ml Final    NOROXYCODONE 01/08/2024 Negative  50 ng/ml ng/ml Final    OXYMORPHONE 01/08/2024 Negative  50 ng/ml ng/ml Final    TAPENTADOL 01/08/2024 Negative  50 ng/ml ng/ml Final    TRAMADOL 01/08/2024 Negative  50 ng/ml ng/ml Final    BUPRENORPHINE 01/08/2024 Negative  7.5 ng/ml ng/ml Final    NORBUPRENORPHINE 01/08/2024 Negative  10 ng/ml ng/ml Final    AMOBARBITAL 01/08/2024 Negative  100 ng/ml ng/ml Final    BUTABARBITAL 01/08/2024 Negative  100 ng/ml ng/ml Final    BUTALBITAL 01/08/2024 Negative  100 ng/ml ng/ml Final    PHENOBARBITAL 01/08/2024 Negative  100 ng/ml ng/ml Final    SECOBARBITAL 01/08/2024 Negative  100 ng/ml ng/ml Final    ALPRAZOLAM 01/08/2024 Negative  50 ng/ml ng/ml Final    ALPHA-HYDROXYALPRAZOLAM 01/08/2024 Negative  50 ng/ml ng/ml Final    CLONAZEPAM 01/08/2024  Negative  50 ng/ml ng/ml Final    7- AMINOCLONAZEPAM 01/08/2024 Negative  50 ng/ml ng/ml Final    DIAZEPAM 01/08/2024 Negative  50 ng/ml ng/ml Final    NORDIAZEPAM 01/08/2024 Negative  50 ng/ml ng/ml Final    LORAZEPAM 01/08/2024 Negative  100 ng/ml ng/ml Final    MIDAZOLAM 01/08/2024 Negative  50 ng/ml ng/ml Final    ALPHA-HYDROXYMIDAZOLAM 01/08/2024 Negative  50 ng/ml ng/ml Final    OXAZEPAM 01/08/2024 Negative  50 ng/ml ng/ml Final    TEMAZEPAM 01/08/2024 Negative  50 ng/ml ng/ml Final    ETG 01/08/2024 Negative  200 ng/ml ng/ml Final    BENZOYLECGONINE 01/08/2024 Negative  100 ng/ml ng/ml Final    6-DEJUAN 01/08/2024 Negative  10 ng/ml ng/ml Final    MDMA 01/08/2024 Negative  100 ng/ml ng/ml Final    PCP 01/08/2024 Negative  20 ng/ml ng/ml Final    DELTA-9-THC 01/08/2024 Negative  20 ng/ml ng/ml Final    AMPHETAMINE 01/08/2024 >2500 (C)  250 ng/ml ng/ml Final    METHAMPHETAMINE 01/08/2024 >2000 (C)  100 ng/ml ng/ml Final    METHYLPHENIDATE 01/08/2024 Negative  10 ng/ml ng/ml Final    PHENTERMINE 01/08/2024 Negative  100 ng/ml ng/ml Final    RITALINIC ACID 01/08/2024 Negative  50 ng/ml ng/ml Final    CARISOPRODOL 01/08/2024 Negative  100 ng/ml ng/ml Final    GABAPENTIN 01/08/2024 Negative  500 ng/ml ng/ml Final    PREGABALIN 01/08/2024 Negative  250 ng/ml ng/ml Final    ZOLPIDEM 01/08/2024 Negative  2 ng/ml ng/ml Final    CARBOXYZOLPIDEM 01/08/2024 Negative  10 ng/ml ng/ml Final       Mental Status Exam  Hygiene:  good  Dress: casual  Attitude: cooperative and agreeable   Motor Activity: appropriate  Eye Contact:  good  Speech: regular rate and rhythm   Mood:  calm and cooperative  Affect:  Appropriate  Thought Processes:  Linear  Thought Content:  Normal  Suicidal Thoughts:  denies  Homicidal Thoughts:  denies  Crisis Safety Plan: Safety plan has been discussed.   Hallucinations:  Unknown to clinician.   Reliability: fair  Insight: fair  Judgement: fair  Impulse Control: fair    Recovery/spiritual support group  attendance: Patient on check in form reported 1.      Progress toward goal: Not at goal    Prognosis: Fair with Ongoing Treatment     Self-reported number of days sober: Patient on check in form reported 15 days.     Patient will contact this office, call 911 or present to the nearest emergency room should suicidal or homicidal ideations occur.    Impression/Formulation:    ICD-10-CM ICD-9-CM   1. Opioid use disorder, severe, in sustained remission  F11.21 304.03   2. Alcohol use disorder, severe, in sustained remission  F10.21 303.93   3. Methamphetamine use disorder, severe, dependence  F15.20 304.40   4. Cannabis use disorder, mild, abuse  F12.10 305.20       Clinical Maneuvering/Interventions: Therapist utilized a person-centered approach to build rapport with group member. Therapist implemented motivational interviewing techniques to assist client with exploring and resolving ambivalence associated with commitment to change behaviors related to substance use and addiction. Therapist applied cognitive behavioral strategies to facilitate identification of maladaptive patterns of thinking and behavior that contribute to client's risk for continued substance use and relapse. Therapist employed group interaction activities to build rapport among group members, promote sobriety, and emphasize relapse prevention. Therapist promoted safe nonjudgmental environment by providing group members with unconditional positive regard and encouraging group members to comply with group rules and guidelines. Therapist assisted group member with identifying and implementing healthier coping strategies.      Plan:  Continue [facility] IOP Phase I   Aftercare:  [facility] Phase II  Program Assignments:  Personal recovery plan, relapse prevention plan, attendance of recovery support group meetings, exploration of sponsorship, drug/alcohol screens.     Octaviano Crawley,  LCSW  1/24/2024  12:29 EST

## 2024-01-25 ENCOUNTER — OFFICE VISIT (OUTPATIENT)
Dept: PSYCHIATRY | Facility: HOSPITAL | Age: 44
End: 2024-01-25
Payer: COMMERCIAL

## 2024-01-25 DIAGNOSIS — F12.10 CANNABIS USE DISORDER, MILD, ABUSE: ICD-10-CM

## 2024-01-25 DIAGNOSIS — F15.20 METHAMPHETAMINE USE DISORDER, SEVERE, DEPENDENCE: ICD-10-CM

## 2024-01-25 DIAGNOSIS — F10.21 ALCOHOL USE DISORDER, SEVERE, IN SUSTAINED REMISSION: ICD-10-CM

## 2024-01-25 DIAGNOSIS — F11.21 OPIOID USE DISORDER, SEVERE, IN SUSTAINED REMISSION: Primary | ICD-10-CM

## 2024-01-25 PROCEDURE — H0015 ALCOHOL AND/OR DRUG SERVICES: HCPCS | Performed by: NURSE PRACTITIONER

## 2024-01-29 ENCOUNTER — OFFICE VISIT (OUTPATIENT)
Dept: PSYCHIATRY | Facility: HOSPITAL | Age: 44
End: 2024-01-29
Payer: COMMERCIAL

## 2024-01-29 DIAGNOSIS — F15.20 METHAMPHETAMINE USE DISORDER, SEVERE, DEPENDENCE: ICD-10-CM

## 2024-01-29 DIAGNOSIS — F10.21 ALCOHOL USE DISORDER, SEVERE, IN SUSTAINED REMISSION: ICD-10-CM

## 2024-01-29 DIAGNOSIS — F11.21 OPIOID USE DISORDER, SEVERE, IN SUSTAINED REMISSION: Primary | ICD-10-CM

## 2024-01-29 DIAGNOSIS — F12.10 CANNABIS USE DISORDER, MILD, ABUSE: ICD-10-CM

## 2024-01-29 PROCEDURE — H0015 ALCOHOL AND/OR DRUG SERVICES: HCPCS | Performed by: NURSE PRACTITIONER

## 2024-01-29 NOTE — PROGRESS NOTES
CD IOP GROUP     Date: 01/25/2024  Name: Nilson Miller    Time In: Patient arrived to group at approximately 06:30 PM.    Time Out: 2038     Number of participants: 7    IOP GROUP NOTE     Data: 3 hour IOP group therapy session (Check-ins, Coping Skills, Relapse Prevention)     Check Ins: Therapist continued facilitation of rapport building strategies between group members. Therapist asked that each patient check in with home life and recovery efforts and identify triggers, cravings, and high risk situations that arise between group sessions. Therapist provided empathy and support during group session.     Session Content/Coping Skills: Deja, joined for first part of meeting. Check ins completed by group members. Clinician allowed group members time to present stressors and stressors were processed as a group. Clinician provided group members with the [facility] telephone number.  educated group members on upcoming expungement clinic. Group discussion regarding resources held. Individual check ins were completed with group members who had not yet completed their individual check in for the week. [facility] [place] Understanding the Opioid Overdose Epidemic, Heroin, and Fentanyl psychoeducational material reviewed (Retrieved from CDC website). [facility] Fentanyl and Carfentanyl psychoeducational material reviewed. Clinician facilitated group discussion on opioid epidemic and overdoses.     Response: Patient attended class in person. Patient participated in completion of check in form. Patient on check in form answered no to question of “currently or since your last group meeting, have you had any suicidal thoughts or plan or intent to hurt yourself”, and patient also answered no on check in form to question of “currently or since your last group meeting, have you had any homicidal thoughts or plan or intent to hurt others”. Patient on check in form reported concerns with sleep  since last group meeting.     Patient during individual check in denied suicidal or homicidal thoughts. Clinician requested patient complete UDS tomorrow as no UDS completed this week. Patient reported during individual check in that he wanted another medication other than Lexapro, clinician emailed APRN on 1/25/2024. Clinician also asked patient to call and reschedule med appointment as he had missed his med appointment earlier in the week.     Personal Assessment 0-10 Scale (0-none, 10-high)    Anxiety:  2   Depression:  0   Cravings: 4     Assessment:     ..  Lab on 01/22/2024   Component Date Value Ref Range Status    Glucose 01/22/2024 119 (H)  65 - 99 mg/dL Final    BUN 01/22/2024 14  6 - 20 mg/dL Final    Creatinine 01/22/2024 1.02  0.76 - 1.27 mg/dL Final    Sodium 01/22/2024 137  136 - 145 mmol/L Final    Potassium 01/22/2024 4.4  3.5 - 5.2 mmol/L Final    Chloride 01/22/2024 103  98 - 107 mmol/L Final    CO2 01/22/2024 24.0  22.0 - 29.0 mmol/L Final    Calcium 01/22/2024 9.0  8.6 - 10.5 mg/dL Final    Total Protein 01/22/2024 8.0  6.0 - 8.5 g/dL Final    Albumin 01/22/2024 4.5  3.5 - 5.2 g/dL Final    ALT (SGPT) 01/22/2024 31  1 - 41 U/L Final    AST (SGOT) 01/22/2024 27  1 - 40 U/L Final    Alkaline Phosphatase 01/22/2024 62  39 - 117 U/L Final    Total Bilirubin 01/22/2024 0.3  0.0 - 1.2 mg/dL Final    Globulin 01/22/2024 3.5  gm/dL Final    A/G Ratio 01/22/2024 1.3  g/dL Final    BUN/Creatinine Ratio 01/22/2024 13.7  7.0 - 25.0 Final    Anion Gap 01/22/2024 10.0  5.0 - 15.0 mmol/L Final    eGFR 01/22/2024 92.9  >60.0 mL/min/1.73 Final    Protime 01/22/2024 12.8  12.3 - 15.1 Seconds Final    INR 01/22/2024 0.92  0.90 - 1.10 Final    HCV, RNA, QUANT 01/22/2024 HCV Not Detected  IU/mL Final    No evidence of active HCV infection.    Test Information 01/22/2024 Comment   Final    The quantitative range of this assay is 15 IU/mL to 100 million IU/mL.    WBC 01/22/2024 5.10  3.40 - 10.80 10*3/mm3 Final    RBC  01/22/2024 5.23  4.14 - 5.80 10*6/mm3 Final    Hemoglobin 01/22/2024 13.2  13.0 - 17.7 g/dL Final    Hematocrit 01/22/2024 41.6  37.5 - 51.0 % Final    MCV 01/22/2024 79.5  79.0 - 97.0 fL Final    MCH 01/22/2024 25.2 (L)  26.6 - 33.0 pg Final    MCHC 01/22/2024 31.7  31.5 - 35.7 g/dL Final    RDW 01/22/2024 17.6 (H)  12.3 - 15.4 % Final    RDW-SD 01/22/2024 50.0  37.0 - 54.0 fl Final    MPV 01/22/2024    Final    Unable to calculate    Platelets 01/22/2024 85 (L)  140 - 450 10*3/mm3 Final   Lab on 01/08/2024   Component Date Value Ref Range Status    THC, Screen, Urine 01/08/2024 Negative  Negative Final    Phencyclidine (PCP), Urine 01/08/2024 Negative  Negative Final    Cocaine Screen, Urine 01/08/2024 Negative  Negative Final    Methamphetamine, Ur 01/08/2024 Positive (A)  Negative Final    Opiate Screen 01/08/2024 Negative  Negative Final    Amphetamine Screen, Urine 01/08/2024 Negative  Negative Final    Benzodiazepine Screen, Urine 01/08/2024 Negative  Negative Final    Tricyclic Antidepressants Screen 01/08/2024 Negative  Negative Final    Methadone Screen, Urine 01/08/2024 Negative  Negative Final    Barbiturates Screen, Urine 01/08/2024 Negative  Negative Final    Oxycodone Screen, Urine 01/08/2024 Negative  Negative Final    Buprenorphine, Screen, Urine 01/08/2024 Negative  Negative Final    PH 01/08/2024 6.1  4.5 - 9 Final    CREATININE 01/08/2024 136.1  20 - 300 mg/dL mg/dL Final    SPECIFIC GRAVITY 01/08/2024 1.026  1.003 - 1.035 Final    CODEINE 01/08/2024 Negative  50 ng/ml ng/ml Final    HYDROCODONE 01/08/2024 Negative  50 ng/ml ng/ml Final    NORHYDROCODONE 01/08/2024 Negative  50 ng/ml ng/ml Final    HYDROMORPHONE 01/08/2024 Negative  50 ng/ml ng/ml Final    MORPHINE 01/08/2024 Negative  50 ng/ml ng/ml Final    FENTANYL 01/08/2024 Negative  2 ng/ml ng/ml Final    NORFENTANYL 01/08/2024 Negative  10 ng/ml ng/ml Final    METHADONE 01/08/2024 Negative  25 ng/ml ng/ml Final    EDDP 01/08/2024 Negative   50 ng/ml ng/ml Final    OXYCODONE 01/08/2024 Negative  50 ng/ml ng/ml Final    NOROXYCODONE 01/08/2024 Negative  50 ng/ml ng/ml Final    OXYMORPHONE 01/08/2024 Negative  50 ng/ml ng/ml Final    TAPENTADOL 01/08/2024 Negative  50 ng/ml ng/ml Final    TRAMADOL 01/08/2024 Negative  50 ng/ml ng/ml Final    BUPRENORPHINE 01/08/2024 Negative  7.5 ng/ml ng/ml Final    NORBUPRENORPHINE 01/08/2024 Negative  10 ng/ml ng/ml Final    AMOBARBITAL 01/08/2024 Negative  100 ng/ml ng/ml Final    BUTABARBITAL 01/08/2024 Negative  100 ng/ml ng/ml Final    BUTALBITAL 01/08/2024 Negative  100 ng/ml ng/ml Final    PHENOBARBITAL 01/08/2024 Negative  100 ng/ml ng/ml Final    SECOBARBITAL 01/08/2024 Negative  100 ng/ml ng/ml Final    ALPRAZOLAM 01/08/2024 Negative  50 ng/ml ng/ml Final    ALPHA-HYDROXYALPRAZOLAM 01/08/2024 Negative  50 ng/ml ng/ml Final    CLONAZEPAM 01/08/2024 Negative  50 ng/ml ng/ml Final    7- AMINOCLONAZEPAM 01/08/2024 Negative  50 ng/ml ng/ml Final    DIAZEPAM 01/08/2024 Negative  50 ng/ml ng/ml Final    NORDIAZEPAM 01/08/2024 Negative  50 ng/ml ng/ml Final    LORAZEPAM 01/08/2024 Negative  100 ng/ml ng/ml Final    MIDAZOLAM 01/08/2024 Negative  50 ng/ml ng/ml Final    ALPHA-HYDROXYMIDAZOLAM 01/08/2024 Negative  50 ng/ml ng/ml Final    OXAZEPAM 01/08/2024 Negative  50 ng/ml ng/ml Final    TEMAZEPAM 01/08/2024 Negative  50 ng/ml ng/ml Final    ETG 01/08/2024 Negative  200 ng/ml ng/ml Final    BENZOYLECGONINE 01/08/2024 Negative  100 ng/ml ng/ml Final    6-DEJUAN 01/08/2024 Negative  10 ng/ml ng/ml Final    MDMA 01/08/2024 Negative  100 ng/ml ng/ml Final    PCP 01/08/2024 Negative  20 ng/ml ng/ml Final    DELTA-9-THC 01/08/2024 Negative  20 ng/ml ng/ml Final    AMPHETAMINE 01/08/2024 >2500 (C)  250 ng/ml ng/ml Final    METHAMPHETAMINE 01/08/2024 >2000 (C)  100 ng/ml ng/ml Final    METHYLPHENIDATE 01/08/2024 Negative  10 ng/ml ng/ml Final    PHENTERMINE 01/08/2024 Negative  100 ng/ml ng/ml Final    RITALINIC ACID 01/08/2024  Negative  50 ng/ml ng/ml Final    CARISOPRODOL 01/08/2024 Negative  100 ng/ml ng/ml Final    GABAPENTIN 01/08/2024 Negative  500 ng/ml ng/ml Final    PREGABALIN 01/08/2024 Negative  250 ng/ml ng/ml Final    ZOLPIDEM 01/08/2024 Negative  2 ng/ml ng/ml Final    CARBOXYZOLPIDEM 01/08/2024 Negative  10 ng/ml ng/ml Final       Mental Status Exam  Hygiene:  good  Dress: casual  Attitude: cooperative and agreeable   Motor Activity: appropriate  Eye Contact:  good  Speech: regular rate and rhythm   Mood:  calm and cooperative  Affect:  Appropriate  Thought Processes:  Linear  Thought Content:  Normal  Suicidal Thoughts:  denies  Homicidal Thoughts:  denies  Crisis Safety Plan: Safety plan has been discussed.   Hallucinations:  Unknown to clinician.   Reliability: fair  Insight: fair  Judgement: fair  Impulse Control: fair    Recovery/spiritual support group attendance: No.      Progress toward goal: Not at goal    Prognosis: Fair with Ongoing Treatment     Self-reported number of days sober: Patient on check in form reported 12.     Patient will contact this office, call 911 or present to the nearest emergency room should suicidal or homicidal ideations occur.    Impression/Formulation:    ICD-10-CM ICD-9-CM   1. Opioid use disorder, severe, in sustained remission  F11.21 304.03   2. Alcohol use disorder, severe, in sustained remission  F10.21 303.93   3. Methamphetamine use disorder, severe, dependence  F15.20 304.40   4. Cannabis use disorder, mild, abuse  F12.10 305.20       Clinical Maneuvering/Interventions: Therapist utilized a person-centered approach to build rapport with group member. Therapist implemented motivational interviewing techniques to assist client with exploring and resolving ambivalence associated with commitment to change behaviors related to substance use and addiction. Therapist applied cognitive behavioral strategies to facilitate identification of maladaptive patterns of thinking and behavior that  contribute to client's risk for continued substance use and relapse. Therapist employed group interaction activities to build rapport among group members, promote sobriety, and emphasize relapse prevention. Therapist promoted safe nonjudgmental environment by providing group members with unconditional positive regard and encouraging group members to comply with group rules and guidelines. Therapist assisted group member with identifying and implementing healthier coping strategies.      Plan:  Continue [facility] IOP Phase I   Aftercare:  [facility] Phase II  Program Assignments:  Personal recovery plan, relapse prevention plan, attendance of recovery support group meetings, exploration of sponsorship, drug/alcohol screens.     Octaviano Crawley LCSW  1/29/2024  13:35 EST

## 2024-01-30 NOTE — PROGRESS NOTES
[facility] IOP GROUP     Date: 01/29/2024  Name: Nilson Miller    Time In: 1800   Time Out: 2055     Number of participants: 9    IOP GROUP NOTE     Data: 3 hour IOP group therapy session (Check-ins, Coping Skills, Relapse Prevention)     Check Ins: Therapist continued facilitation of rapport building strategies between group members. Therapist asked that each patient check in with home life and recovery efforts and identify triggers, cravings, and high risk situations that arise between group sessions. Therapist provided empathy and support during group session.     Session Content/Coping Skills: JASON Newman student, attended session. Deja  joined for first part of meeting. [facility]  also attended for first part of session. Introductions completed. Check ins completed by group members.  shared just for today reading.  discussed step one. Clinician facilitated discussion on concept of surrender related to addiction recovery. Paty shared grief facts and quotes and group discussion was facilitated regarding grief. Group members were asked to explore their grief related to loss of addiction and lifestyle. Article “the role of grief and loss in addiction recovery” reviewed and discussed (NATHALIE Dixon, retrieved from [employer]). Stages of Grief psychoeducational material reviewed by Paty (Retrieved from [employer]). Group discussion held regarding the stages of grief. Paty shared grief and loss coping strategies.     Response: Patient attended class in person. Patient participated in completion of check in form. Patient on check in form answered no to question of “currently or since your last group meeting, have you had any suicidal thoughts or plan or intent to hurt yourself”, and patient also answered no on check in form to question of “currently or since your last group meeting, have you had  any homicidal thoughts or plan or intent to hurt others”.     Clinician explained to patient that he must complete UDS the following day or we would have to discharge patient from [facility].     Personal Assessment 0-10 Scale (0-none, 10-high)    Anxiety:  2   Depression:  0   Cravings: 6     Assessment:     ..  Lab on 01/22/2024   Component Date Value Ref Range Status    Glucose 01/22/2024 119 (H)  65 - 99 mg/dL Final    BUN 01/22/2024 14  6 - 20 mg/dL Final    Creatinine 01/22/2024 1.02  0.76 - 1.27 mg/dL Final    Sodium 01/22/2024 137  136 - 145 mmol/L Final    Potassium 01/22/2024 4.4  3.5 - 5.2 mmol/L Final    Chloride 01/22/2024 103  98 - 107 mmol/L Final    CO2 01/22/2024 24.0  22.0 - 29.0 mmol/L Final    Calcium 01/22/2024 9.0  8.6 - 10.5 mg/dL Final    Total Protein 01/22/2024 8.0  6.0 - 8.5 g/dL Final    Albumin 01/22/2024 4.5  3.5 - 5.2 g/dL Final    ALT (SGPT) 01/22/2024 31  1 - 41 U/L Final    AST (SGOT) 01/22/2024 27  1 - 40 U/L Final    Alkaline Phosphatase 01/22/2024 62  39 - 117 U/L Final    Total Bilirubin 01/22/2024 0.3  0.0 - 1.2 mg/dL Final    Globulin 01/22/2024 3.5  gm/dL Final    A/G Ratio 01/22/2024 1.3  g/dL Final    BUN/Creatinine Ratio 01/22/2024 13.7  7.0 - 25.0 Final    Anion Gap 01/22/2024 10.0  5.0 - 15.0 mmol/L Final    eGFR 01/22/2024 92.9  >60.0 mL/min/1.73 Final    Protime 01/22/2024 12.8  12.3 - 15.1 Seconds Final    INR 01/22/2024 0.92  0.90 - 1.10 Final    HCV, RNA, QUANT 01/22/2024 HCV Not Detected  IU/mL Final    No evidence of active HCV infection.    Test Information 01/22/2024 Comment   Final    The quantitative range of this assay is 15 IU/mL to 100 million IU/mL.    WBC 01/22/2024 5.10  3.40 - 10.80 10*3/mm3 Final    RBC 01/22/2024 5.23  4.14 - 5.80 10*6/mm3 Final    Hemoglobin 01/22/2024 13.2  13.0 - 17.7 g/dL Final    Hematocrit 01/22/2024 41.6  37.5 - 51.0 % Final    MCV 01/22/2024 79.5  79.0 - 97.0 fL Final    MCH 01/22/2024 25.2 (L)  26.6 - 33.0 pg Final    MCHC  01/22/2024 31.7  31.5 - 35.7 g/dL Final    RDW 01/22/2024 17.6 (H)  12.3 - 15.4 % Final    RDW-SD 01/22/2024 50.0  37.0 - 54.0 fl Final    MPV 01/22/2024    Final    Unable to calculate    Platelets 01/22/2024 85 (L)  140 - 450 10*3/mm3 Final       Mental Status Exam  Hygiene:  good  Dress: casual  Attitude: cooperative and agreeable   Motor Activity: appropriate  Eye Contact:  good  Speech: regular rate and rhythm   Mood:  calm and cooperative  Affect:  Appropriate  Thought Processes:  Linear  Thought Content:  Normal  Suicidal Thoughts:  denies  Homicidal Thoughts:  denies  Crisis Safety Plan: Safety plan has been discussed.   Hallucinations:  Unknown to clinician.   Reliability: fair  Insight: fair  Judgement: fair  Impulse Control: fair    Recovery/spiritual support group attendance: No.     Progress toward goal: Not at goal    Prognosis: Fair with Ongoing Treatment     Self-reported number of days sober: Patient reported 17 on check in form.     Patient will contact this office, call 911 or present to the nearest emergency room should suicidal or homicidal ideations occur.    Impression/Formulation:    ICD-10-CM ICD-9-CM   1. Opioid use disorder, severe, in sustained remission  F11.21 304.03   2. Alcohol use disorder, severe, in sustained remission  F10.21 303.93   3. Methamphetamine use disorder, severe, dependence  F15.20 304.40   4. Cannabis use disorder, mild, abuse  F12.10 305.20       Clinical Maneuvering/Interventions: Therapist utilized a person-centered approach to build rapport with group member. Therapist implemented motivational interviewing techniques to assist client with exploring and resolving ambivalence associated with commitment to change behaviors related to substance use and addiction. Therapist applied cognitive behavioral strategies to facilitate identification of maladaptive patterns of thinking and behavior that contribute to client's risk for continued substance use and relapse.  Therapist employed group interaction activities to build rapport among group members, promote sobriety, and emphasize relapse prevention. Therapist promoted safe nonjudgmental environment by providing group members with unconditional positive regard and encouraging group members to comply with group rules and guidelines. Therapist assisted group member with identifying and implementing healthier coping strategies.      Plan:  Continue [facility] IOP Phase I   Aftercare:  [facility] Phase II  Program Assignments:  Personal recovery plan, relapse prevention plan, attendance of recovery support group meetings, exploration of sponsorship, drug/alcohol screens.     Octaviano Crawley LCSW  1/30/2024  19:32 EST

## 2024-01-31 ENCOUNTER — LAB (OUTPATIENT)
Dept: LAB | Facility: HOSPITAL | Age: 44
End: 2024-01-31
Payer: COMMERCIAL

## 2024-01-31 ENCOUNTER — OFFICE VISIT (OUTPATIENT)
Dept: PSYCHIATRY | Facility: HOSPITAL | Age: 44
End: 2024-01-31
Payer: COMMERCIAL

## 2024-01-31 DIAGNOSIS — F15.20 METHAMPHETAMINE USE DISORDER, SEVERE, DEPENDENCE: ICD-10-CM

## 2024-01-31 DIAGNOSIS — F10.21 ALCOHOL USE DISORDER, SEVERE, IN SUSTAINED REMISSION: ICD-10-CM

## 2024-01-31 DIAGNOSIS — F11.21 OPIOID USE DISORDER, SEVERE, IN SUSTAINED REMISSION: Primary | ICD-10-CM

## 2024-01-31 DIAGNOSIS — F12.10 CANNABIS USE DISORDER, MILD, ABUSE: ICD-10-CM

## 2024-01-31 LAB
AMPHET+METHAMPHET UR QL: POSITIVE
AMPHETAMINES UR QL: POSITIVE
BARBITURATES UR QL SCN: NEGATIVE
BENZODIAZ UR QL SCN: NEGATIVE
BUPRENORPHINE SERPL-MCNC: NEGATIVE NG/ML
CANNABINOIDS SERPL QL: NEGATIVE
COCAINE UR QL: NEGATIVE
METHADONE UR QL SCN: NEGATIVE
OPIATES UR QL: NEGATIVE
OXYCODONE UR QL SCN: NEGATIVE
PCP UR QL SCN: NEGATIVE
TRICYCLICS UR QL SCN: NEGATIVE

## 2024-01-31 PROCEDURE — H0015 ALCOHOL AND/OR DRUG SERVICES: HCPCS | Performed by: NURSE PRACTITIONER

## 2024-01-31 PROCEDURE — 80306 DRUG TEST PRSMV INSTRMNT: CPT

## 2024-02-01 ENCOUNTER — OFFICE VISIT (OUTPATIENT)
Dept: PSYCHIATRY | Facility: HOSPITAL | Age: 44
End: 2024-02-01
Payer: COMMERCIAL

## 2024-02-01 DIAGNOSIS — F12.10 CANNABIS USE DISORDER, MILD, ABUSE: ICD-10-CM

## 2024-02-01 DIAGNOSIS — F10.21 ALCOHOL USE DISORDER, SEVERE, IN SUSTAINED REMISSION: ICD-10-CM

## 2024-02-01 DIAGNOSIS — F11.21 OPIOID USE DISORDER, SEVERE, IN SUSTAINED REMISSION: Primary | ICD-10-CM

## 2024-02-01 DIAGNOSIS — F15.20 METHAMPHETAMINE USE DISORDER, SEVERE, DEPENDENCE: ICD-10-CM

## 2024-02-01 PROCEDURE — H0015 ALCOHOL AND/OR DRUG SERVICES: HCPCS | Performed by: NURSE PRACTITIONER

## 2024-02-01 NOTE — PROGRESS NOTES
CD IOP GROUP     Date: 01/31/2024  Name: Nislon Miller    Time In: 1800   Time Out: 2055     Number of participants: 7    IOP GROUP NOTE     Data: 3 hour IOP group therapy session (Check-ins, Coping Skills, Relapse Prevention)     Check Ins: Therapist continued facilitation of rapport building strategies between group members. Therapist asked that each patient check in with home life and recovery efforts and identify triggers, cravings, and high risk situations that arise between group sessions. Therapist provided empathy and support during group session.     Session Content/Coping Skills: [employer] student, attended session. Deja  joined for first part of meeting. [facility]  also attended for first part of session. Check ins completed by group members.  shared Just for Today reading. Group discussion held regarding concept of Trust and Higher Power. Activity “The You in Recovery” began (Retrieved from [employer] (2021)). Group members were asked to explore their individual strengths. Delia “Step Two” psychoeducational material reviewed and discussed.     Response: Patient attended class in person. Patient participated in completion of check in form. Patient on check in form answered no to question of “currently or since your last group meeting, have you had any suicidal thoughts or plan or intent to hurt yourself”, and patient also answered no on check in form to question of “currently or since your last group meeting, have you had any homicidal thoughts or plan or intent to hurt others”.     Clinician, Andrew Newman, and Deja met individually with patient. Patient reported substance use on Monday. Clinician explained to patient that moving forward he would need to screen weekly. Patient denied suicidal or homicidal thoughts. Patient reported that he has upcoming appointment with a suboxone clinic.      Personal Assessment 0-10 Scale (0-none, 10-high)    Anxiety:  5   Depression:  0   Cravings: 6     Assessment:     ..  Lab on 01/31/2024   Component Date Value Ref Range Status    THC, Screen, Urine 01/31/2024 Negative  Negative Final    Phencyclidine (PCP), Urine 01/31/2024 Negative  Negative Final    Cocaine Screen, Urine 01/31/2024 Negative  Negative Final    Methamphetamine, Ur 01/31/2024 Positive (A)  Negative Final    Opiate Screen 01/31/2024 Negative  Negative Final    Amphetamine Screen, Urine 01/31/2024 Positive (A)  Negative Final    Benzodiazepine Screen, Urine 01/31/2024 Negative  Negative Final    Tricyclic Antidepressants Screen 01/31/2024 Negative  Negative Final    Methadone Screen, Urine 01/31/2024 Negative  Negative Final    Barbiturates Screen, Urine 01/31/2024 Negative  Negative Final    Oxycodone Screen, Urine 01/31/2024 Negative  Negative Final    Buprenorphine, Screen, Urine 01/31/2024 Negative  Negative Final   Lab on 01/22/2024   Component Date Value Ref Range Status    Glucose 01/22/2024 119 (H)  65 - 99 mg/dL Final    BUN 01/22/2024 14  6 - 20 mg/dL Final    Creatinine 01/22/2024 1.02  0.76 - 1.27 mg/dL Final    Sodium 01/22/2024 137  136 - 145 mmol/L Final    Potassium 01/22/2024 4.4  3.5 - 5.2 mmol/L Final    Chloride 01/22/2024 103  98 - 107 mmol/L Final    CO2 01/22/2024 24.0  22.0 - 29.0 mmol/L Final    Calcium 01/22/2024 9.0  8.6 - 10.5 mg/dL Final    Total Protein 01/22/2024 8.0  6.0 - 8.5 g/dL Final    Albumin 01/22/2024 4.5  3.5 - 5.2 g/dL Final    ALT (SGPT) 01/22/2024 31  1 - 41 U/L Final    AST (SGOT) 01/22/2024 27  1 - 40 U/L Final    Alkaline Phosphatase 01/22/2024 62  39 - 117 U/L Final    Total Bilirubin 01/22/2024 0.3  0.0 - 1.2 mg/dL Final    Globulin 01/22/2024 3.5  gm/dL Final    A/G Ratio 01/22/2024 1.3  g/dL Final    BUN/Creatinine Ratio 01/22/2024 13.7  7.0 - 25.0 Final    Anion Gap 01/22/2024 10.0  5.0 - 15.0 mmol/L Final    eGFR 01/22/2024 92.9  >60.0  mL/min/1.73 Final    Protime 01/22/2024 12.8  12.3 - 15.1 Seconds Final    INR 01/22/2024 0.92  0.90 - 1.10 Final    HCV, RNA, QUANT 01/22/2024 HCV Not Detected  IU/mL Final    No evidence of active HCV infection.    Test Information 01/22/2024 Comment   Final    The quantitative range of this assay is 15 IU/mL to 100 million IU/mL.    WBC 01/22/2024 5.10  3.40 - 10.80 10*3/mm3 Final    RBC 01/22/2024 5.23  4.14 - 5.80 10*6/mm3 Final    Hemoglobin 01/22/2024 13.2  13.0 - 17.7 g/dL Final    Hematocrit 01/22/2024 41.6  37.5 - 51.0 % Final    MCV 01/22/2024 79.5  79.0 - 97.0 fL Final    MCH 01/22/2024 25.2 (L)  26.6 - 33.0 pg Final    MCHC 01/22/2024 31.7  31.5 - 35.7 g/dL Final    RDW 01/22/2024 17.6 (H)  12.3 - 15.4 % Final    RDW-SD 01/22/2024 50.0  37.0 - 54.0 fl Final    MPV 01/22/2024    Final    Unable to calculate    Platelets 01/22/2024 85 (L)  140 - 450 10*3/mm3 Final       Mental Status Exam  Hygiene:  good  Dress: casual  Attitude: cooperative and agreeable   Motor Activity: appropriate  Eye Contact:  good  Speech: regular rate and rhythm   Mood:  calm and cooperative  Affect:  Appropriate  Thought Processes:  Linear  Thought Content:  Normal  Suicidal Thoughts:  denies  Homicidal Thoughts:  denies  Crisis Safety Plan: Safety plan has been discussed.   Hallucinations:  Unknown to clinician.   Reliability: fair  Insight: fair  Judgement: fair  Impulse Control: fair    Recovery/spiritual support group attendance: No.      Progress toward goal: Not at goal    Prognosis: Fair with Ongoing Treatment     Self-reported number of days sober: Patient reported 17 days on check in form.     Patient will contact this office, call 911 or present to the nearest emergency room should suicidal or homicidal ideations occur.    Impression/Formulation:    ICD-10-CM ICD-9-CM   1. Opioid use disorder, severe, in sustained remission  F11.21 304.03   2. Alcohol use disorder, severe, in sustained remission  F10.21 303.93   3.  Methamphetamine use disorder, severe, dependence  F15.20 304.40   4. Cannabis use disorder, mild, abuse  F12.10 305.20       Clinical Maneuvering/Interventions: Therapist utilized a person-centered approach to build rapport with group member. Therapist implemented motivational interviewing techniques to assist client with exploring and resolving ambivalence associated with commitment to change behaviors related to substance use and addiction. Therapist applied cognitive behavioral strategies to facilitate identification of maladaptive patterns of thinking and behavior that contribute to client's risk for continued substance use and relapse. Therapist employed group interaction activities to build rapport among group members, promote sobriety, and emphasize relapse prevention. Therapist promoted safe nonjudgmental environment by providing group members with unconditional positive regard and encouraging group members to comply with group rules and guidelines. Therapist assisted group member with identifying and implementing healthier coping strategies.      Plan:  Continue [facility] IOP Phase I   Aftercare:  [facility] Phase II  Program Assignments:  Personal recovery plan, relapse prevention plan, attendance of recovery support group meetings, exploration of sponsorship, drug/alcohol screens.     Octaviano Crawley LCSW  2/1/2024  15:05 EST

## 2024-02-05 ENCOUNTER — OFFICE VISIT (OUTPATIENT)
Dept: PSYCHIATRY | Facility: HOSPITAL | Age: 44
End: 2024-02-05
Payer: COMMERCIAL

## 2024-02-05 DIAGNOSIS — F15.20 METHAMPHETAMINE USE DISORDER, SEVERE, DEPENDENCE: ICD-10-CM

## 2024-02-05 DIAGNOSIS — F10.21 ALCOHOL USE DISORDER, SEVERE, IN SUSTAINED REMISSION: ICD-10-CM

## 2024-02-05 DIAGNOSIS — F11.21 OPIOID USE DISORDER, SEVERE, IN SUSTAINED REMISSION: Primary | ICD-10-CM

## 2024-02-05 DIAGNOSIS — F12.10 CANNABIS USE DISORDER, MILD, ABUSE: ICD-10-CM

## 2024-02-05 LAB
1OH-MIDAZOLAM UR CFM-MCNC: NEGATIVE NG/ML
6MAM UR CFM-MCNC: NEGATIVE NG/ML
7AMINOCLONAZEPAM UR CFM-MCNC: NEGATIVE NG/ML
7AMINOCLONAZEPAM UR CFM-MCNC: NEGATIVE NG/ML
A-OH ALPRAZ UR CFM-MCNC: NEGATIVE NG/ML
ALPRAZ UR CFM-MCNC: NEGATIVE NG/ML
AMOBARBITAL UR CFM-MCNC: NEGATIVE NG/ML
AMPHET UR CFM-MCNC: >2500 NG/ML
BUPRENORPHINE UR-MCNC: NEGATIVE NG/ML
BUTABARBITAL UR CFM-MCNC: NEGATIVE NG/ML
BUTALBITAL UR CFM-MCNC: NEGATIVE NG/ML
BZE UR CFM-MCNC: NEGATIVE NG/ML
CARBOXYTHC UR CFM-MCNC: NEGATIVE NG/ML
CARISOPRODOL UR CFM-MCNC: NEGATIVE NG/ML
CODEINE UR CFM-MCNC: NEGATIVE NG/ML
CREATININE: 245.6 MG/DL
DIAZEPAM UR CFM-MCNC: NEGATIVE NG/ML
EDDP UR CFM-MCNC: NEGATIVE NG/ML
ETHYL GLUCURONIDE UR CFM-MCNC: NEGATIVE NG/ML
ETS: NEGATIVE NG/ML
FENTANYL UR-MCNC: NEGATIVE NG/ML
GABAPENTIN UR CFM-MCNC: NEGATIVE NG/ML
HYDROCODONE UR CFM-MCNC: NEGATIVE NG/ML
HYDROMORPHONE UR CFM-MCNC: NEGATIVE NG/ML
LORAZEPAM UR CFM-MCNC: NEGATIVE NG/ML
MDMA UR CFM-MCNC: NEGATIVE NG/ML
ME-PHENIDATE UR CFM-MCNC: NEGATIVE NG/ML
METHADONE UR CFM-MCNC: NEGATIVE NG/ML
METHAMPHET UR CFM-MCNC: >2000 NG/ML
MIDAZOLAM UR CFM-MCNC: NEGATIVE NG/ML
MORPHINE UR CFM-MCNC: NEGATIVE NG/ML
NORBUPRENORPHINE UR CFM-MCNC: NEGATIVE NG/ML
NORDIAZEPAM UR CFM-MCNC: NEGATIVE NG/ML
NORFENTANYL UR CFM-MCNC: NEGATIVE NG/ML
NORHYDROCODONE UR CFM-MCNC: NEGATIVE NG/ML
NOROXYCODONE UR CFM-MCNC: NEGATIVE NG/ML
OXAZEPAM CTO UR CFM-MCNC: NEGATIVE NG/ML
OXYCODONE SERPL-MCNC: NEGATIVE NG/ML
OXYMORPHONE SERPL-MCNC: NEGATIVE NG/ML
PCP UR CFM-MCNC: NEGATIVE NG/ML
PH: 5.7 (ref 4.5–9)
PHENOBARB UR CFM-MCNC: NEGATIVE NG/ML
PHENTERMINE: NEGATIVE NG/ML
PPAA UR CFM-MCNC: NEGATIVE NG/ML
PREGABALIN UR CFM-MCNC: NEGATIVE NG/ML
SECOBARBITAL UR CFM-MCNC: NEGATIVE NG/ML
SPECIFIC GRAVITY: 1.03 (ref 1–1.03)
TAPENTADOL UR CFM-MCNC: NEGATIVE NG/ML
TEMAZEPAM UR CFM-MCNC: NEGATIVE NG/ML
TRAMADOL: NEGATIVE NG/ML
ZOLPIDEM PHENYL-4-CARB UR CFM-MCNC: NEGATIVE NG/ML
ZOLPIDEM UR CFM-MCNC: NEGATIVE NG/ML

## 2024-02-05 PROCEDURE — H0015 ALCOHOL AND/OR DRUG SERVICES: HCPCS | Performed by: NURSE PRACTITIONER

## 2024-02-05 NOTE — PROGRESS NOTES
CD IOP GROUP     Date: 02/01/2024  Name: Nilson Miller    Time In: 1800   Time Out: 2054     Number of participants: 8    IOP GROUP NOTE     Data: 3 hour IOP group therapy session (Check-ins, Coping Skills, Relapse Prevention)     Check Ins: Therapist continued facilitation of rapport building strategies between group members. Therapist asked that each patient check in with home life and recovery efforts and identify triggers, cravings, and high risk situations that arise between group sessions. Therapist provided empathy and support during group session.     Session Content/Coping Skills: J[employer]ON [employer] student, attended session. Deja  joined for first part of meeting. Check ins completed by group members. Clinician allowed time for processing of presented group stressors. “Three G's to Keep You Going” psychoeducational material reviewed and discussed (Retrieved from taking the escalator). Jenga activity incorporated to answer article process questions. The You in Recovery activity continued from the previous group session. Group members participated in completion of Higher Power Boxes. Clinician educated group members on how the box can be utilized as a method of turning what is outside of their control over to their higher power.     Response: Patient attended class in person. Patient participated in completion of check in form. Patient on check in form answered no to question of “currently or since your last group meeting, have you had any suicidal thoughts or plan or intent to hurt yourself”, and patient also answered no on check in form to question of “currently or since your last group meeting, have you had any homicidal thoughts or plan or intent to hurt others”.     Personal Assessment 0-10 Scale (0-none, 10-high)    Anxiety:  2   Depression:  0   Cravings: 5     Assessment:     ..  Lab on 01/31/2024   Component Date Value Ref Range Status    PH 01/31/2024 5.7  4.5 - 9 Final     CREATININE 01/31/2024 245.6  20 - 300 mg/dL mg/dL Final    SPECIFIC GRAVITY 01/31/2024 1.034  1.003 - 1.035 Final    CODEINE 01/31/2024 Negative  50 ng/ml ng/ml Final    HYDROCODONE 01/31/2024 Negative  50 ng/ml ng/ml Final    NORHYDROCODONE 01/31/2024 Negative  50 ng/ml ng/ml Final    HYDROMORPHONE 01/31/2024 Negative  50 ng/ml ng/ml Final    MORPHINE 01/31/2024 Negative  50 ng/ml ng/ml Final    FENTANYL 01/31/2024 Negative  2 ng/ml ng/ml Final    NORFENTANYL 01/31/2024 Negative  10 ng/ml ng/ml Final    METHADONE 01/31/2024 Negative  25 ng/ml ng/ml Final    EDDP 01/31/2024 Negative  50 ng/ml ng/ml Final    OXYCODONE 01/31/2024 Negative  50 ng/ml ng/ml Final    NOROXYCODONE 01/31/2024 Negative  50 ng/ml ng/ml Final    OXYMORPHONE 01/31/2024 Negative  50 ng/ml ng/ml Final    TAPENTADOL 01/31/2024 Negative  50 ng/ml ng/ml Final    TRAMADOL 01/31/2024 Negative  50 ng/ml ng/ml Final    BUPRENORPHINE 01/31/2024 Negative  7.5 ng/ml ng/ml Final    NORBUPRENORPHINE 01/31/2024 Negative  10 ng/ml ng/ml Final    AMOBARBITAL 01/31/2024 Negative  100 ng/ml ng/ml Final    BUTABARBITAL 01/31/2024 Negative  100 ng/ml ng/ml Final    BUTALBITAL 01/31/2024 Negative  100 ng/ml ng/ml Final    PHENOBARBITAL 01/31/2024 Negative  100 ng/ml ng/ml Final    SECOBARBITAL 01/31/2024 Negative  100 ng/ml ng/ml Final    ALPRAZOLAM 01/31/2024 Negative  50 ng/ml ng/ml Final    ALPHA-HYDROXYALPRAZOLAM 01/31/2024 Negative  50 ng/ml ng/ml Final    CLONAZEPAM 01/31/2024 Negative  50 ng/ml ng/ml Final    7- AMINOCLONAZEPAM 01/31/2024 Negative  50 ng/ml ng/ml Final    DIAZEPAM 01/31/2024 Negative  50 ng/ml ng/ml Final    NORDIAZEPAM 01/31/2024 Negative  50 ng/ml ng/ml Final    LORAZEPAM 01/31/2024 Negative  100 ng/ml ng/ml Final    MIDAZOLAM 01/31/2024 Negative  50 ng/ml ng/ml Final    ALPHA-HYDROXYMIDAZOLAM 01/31/2024 Negative  50 ng/ml ng/ml Final    OXAZEPAM 01/31/2024 Negative  50 ng/ml ng/ml Final    TEMAZEPAM 01/31/2024 Negative  50 ng/ml ng/ml  Final    ETG 01/31/2024 Negative  200 ng/ml ng/ml Final    ETS 01/31/2024 Negative  50 ng/ml ng/ml Final    BENZOYLECGONINE 01/31/2024 Negative  100 ng/ml ng/ml Final    6-DEJUAN 01/31/2024 Negative  10 ng/ml ng/ml Final    MDMA 01/31/2024 Negative  100 ng/ml ng/ml Final    PCP 01/31/2024 Negative  20 ng/ml ng/ml Final    DELTA-9-THC 01/31/2024 Negative  20 ng/ml ng/ml Final    AMPHETAMINE 01/31/2024 >2500 (C)  250 ng/ml ng/ml Final    METHAMPHETAMINE 01/31/2024 >2000 (C)  100 ng/ml ng/ml Final    METHYLPHENIDATE 01/31/2024 Negative  10 ng/ml ng/ml Final    PHENTERMINE 01/31/2024 Negative  100 ng/ml ng/ml Final    RITALINIC ACID 01/31/2024 Negative  50 ng/ml ng/ml Final    CARISOPRODOL 01/31/2024 Negative  100 ng/ml ng/ml Final    GABAPENTIN 01/31/2024 Negative  500 ng/ml ng/ml Final    PREGABALIN 01/31/2024 Negative  250 ng/ml ng/ml Final    ZOLPIDEM 01/31/2024 Negative  2 ng/ml ng/ml Final    CARBOXYZOLPIDEM 01/31/2024 Negative  10 ng/ml ng/ml Final    THC, Screen, Urine 01/31/2024 Negative  Negative Final    Phencyclidine (PCP), Urine 01/31/2024 Negative  Negative Final    Cocaine Screen, Urine 01/31/2024 Negative  Negative Final    Methamphetamine, Ur 01/31/2024 Positive (A)  Negative Final    Opiate Screen 01/31/2024 Negative  Negative Final    Amphetamine Screen, Urine 01/31/2024 Positive (A)  Negative Final    Benzodiazepine Screen, Urine 01/31/2024 Negative  Negative Final    Tricyclic Antidepressants Screen 01/31/2024 Negative  Negative Final    Methadone Screen, Urine 01/31/2024 Negative  Negative Final    Barbiturates Screen, Urine 01/31/2024 Negative  Negative Final    Oxycodone Screen, Urine 01/31/2024 Negative  Negative Final    Buprenorphine, Screen, Urine 01/31/2024 Negative  Negative Final   Lab on 01/22/2024   Component Date Value Ref Range Status    Glucose 01/22/2024 119 (H)  65 - 99 mg/dL Final    BUN 01/22/2024 14  6 - 20 mg/dL Final    Creatinine 01/22/2024 1.02  0.76 - 1.27 mg/dL Final    Sodium  01/22/2024 137  136 - 145 mmol/L Final    Potassium 01/22/2024 4.4  3.5 - 5.2 mmol/L Final    Chloride 01/22/2024 103  98 - 107 mmol/L Final    CO2 01/22/2024 24.0  22.0 - 29.0 mmol/L Final    Calcium 01/22/2024 9.0  8.6 - 10.5 mg/dL Final    Total Protein 01/22/2024 8.0  6.0 - 8.5 g/dL Final    Albumin 01/22/2024 4.5  3.5 - 5.2 g/dL Final    ALT (SGPT) 01/22/2024 31  1 - 41 U/L Final    AST (SGOT) 01/22/2024 27  1 - 40 U/L Final    Alkaline Phosphatase 01/22/2024 62  39 - 117 U/L Final    Total Bilirubin 01/22/2024 0.3  0.0 - 1.2 mg/dL Final    Globulin 01/22/2024 3.5  gm/dL Final    A/G Ratio 01/22/2024 1.3  g/dL Final    BUN/Creatinine Ratio 01/22/2024 13.7  7.0 - 25.0 Final    Anion Gap 01/22/2024 10.0  5.0 - 15.0 mmol/L Final    eGFR 01/22/2024 92.9  >60.0 mL/min/1.73 Final    Protime 01/22/2024 12.8  12.3 - 15.1 Seconds Final    INR 01/22/2024 0.92  0.90 - 1.10 Final    HCV, RNA, QUANT 01/22/2024 HCV Not Detected  IU/mL Final    No evidence of active HCV infection.    Test Information 01/22/2024 Comment   Final    The quantitative range of this assay is 15 IU/mL to 100 million IU/mL.    WBC 01/22/2024 5.10  3.40 - 10.80 10*3/mm3 Final    RBC 01/22/2024 5.23  4.14 - 5.80 10*6/mm3 Final    Hemoglobin 01/22/2024 13.2  13.0 - 17.7 g/dL Final    Hematocrit 01/22/2024 41.6  37.5 - 51.0 % Final    MCV 01/22/2024 79.5  79.0 - 97.0 fL Final    MCH 01/22/2024 25.2 (L)  26.6 - 33.0 pg Final    MCHC 01/22/2024 31.7  31.5 - 35.7 g/dL Final    RDW 01/22/2024 17.6 (H)  12.3 - 15.4 % Final    RDW-SD 01/22/2024 50.0  37.0 - 54.0 fl Final    MPV 01/22/2024    Final    Unable to calculate    Platelets 01/22/2024 85 (L)  140 - 450 10*3/mm3 Final       Mental Status Exam  Hygiene:  good  Dress: casual  Attitude: cooperative and agreeable   Motor Activity: appropriate  Eye Contact:  good  Speech: regular rate and rhythm   Mood:  calm and cooperative  Affect:  Appropriate  Thought Processes:  Linear  Thought Content:   Normal  Suicidal Thoughts:  denies  Homicidal Thoughts:  denies  Crisis Safety Plan: Safety plan has been discussed.   Hallucinations:  Unknown to clinician.   Reliability: fair  Insight: fair  Judgement: fair  Impulse Control: fair    Recovery/spiritual support group attendance: No.     Progress toward goal: Not at goal    Prognosis: Fair with Ongoing Treatment     Self-reported number of days sober: Patient reported 3 on check in form.     Patient will contact this office, call 911 or present to the nearest emergency room should suicidal or homicidal ideations occur.    Impression/Formulation:    ICD-10-CM ICD-9-CM   1. Opioid use disorder, severe, in sustained remission  F11.21 304.03   2. Alcohol use disorder, severe, in sustained remission  F10.21 303.93   3. Methamphetamine use disorder, severe, dependence  F15.20 304.40   4. Cannabis use disorder, mild, abuse  F12.10 305.20       Clinical Maneuvering/Interventions: Therapist utilized a person-centered approach to build rapport with group member. Therapist implemented motivational interviewing techniques to assist client with exploring and resolving ambivalence associated with commitment to change behaviors related to substance use and addiction. Therapist applied cognitive behavioral strategies to facilitate identification of maladaptive patterns of thinking and behavior that contribute to client's risk for continued substance use and relapse. Therapist employed group interaction activities to build rapport among group members, promote sobriety, and emphasize relapse prevention. Therapist promoted safe nonjudgmental environment by providing group members with unconditional positive regard and encouraging group members to comply with group rules and guidelines. Therapist assisted group member with identifying and implementing healthier coping strategies.      Plan:  Continue [facility] IOP Phase I   Aftercare:  [facility]  Program Assignments:  Personal recovery plan, relapse prevention plan, attendance of recovery support group meetings, exploration of sponsorship, drug/alcohol screens.     Octaviano Crawley LCSW  2/5/2024  14:39 EST

## 2024-02-07 ENCOUNTER — LAB (OUTPATIENT)
Dept: LAB | Facility: HOSPITAL | Age: 44
End: 2024-02-07
Payer: COMMERCIAL

## 2024-02-07 ENCOUNTER — OFFICE VISIT (OUTPATIENT)
Dept: PSYCHIATRY | Facility: HOSPITAL | Age: 44
End: 2024-02-07
Payer: COMMERCIAL

## 2024-02-07 DIAGNOSIS — F12.10 CANNABIS USE DISORDER, MILD, ABUSE: ICD-10-CM

## 2024-02-07 DIAGNOSIS — F11.21 OPIOID USE DISORDER, SEVERE, IN SUSTAINED REMISSION: Primary | ICD-10-CM

## 2024-02-07 DIAGNOSIS — F15.20 METHAMPHETAMINE USE DISORDER, SEVERE, DEPENDENCE: ICD-10-CM

## 2024-02-07 DIAGNOSIS — F10.21 ALCOHOL USE DISORDER, SEVERE, IN SUSTAINED REMISSION: ICD-10-CM

## 2024-02-07 LAB
AMPHET+METHAMPHET UR QL: POSITIVE
AMPHETAMINES UR QL: POSITIVE
BARBITURATES UR QL SCN: NEGATIVE
BENZODIAZ UR QL SCN: NEGATIVE
BUPRENORPHINE SERPL-MCNC: POSITIVE NG/ML
CANNABINOIDS SERPL QL: NEGATIVE
COCAINE UR QL: NEGATIVE
METHADONE UR QL SCN: NEGATIVE
OPIATES UR QL: NEGATIVE
OXYCODONE UR QL SCN: NEGATIVE
PCP UR QL SCN: NEGATIVE
TRICYCLICS UR QL SCN: NEGATIVE

## 2024-02-07 PROCEDURE — H0015 ALCOHOL AND/OR DRUG SERVICES: HCPCS | Performed by: NURSE PRACTITIONER

## 2024-02-07 PROCEDURE — 80306 DRUG TEST PRSMV INSTRMNT: CPT

## 2024-02-08 NOTE — PROGRESS NOTES
CD IOP GROUP     Date: 02/05/2024  Name: Nilson Miller    Time In: 1800   Time Out: 2048     Number of participants: 7    IOP GROUP NOTE     Data: 3 hour IOP group therapy session (Check-ins, Coping Skills, Relapse Prevention)     Check Ins: Therapist continued facilitation of rapport building strategies between group members. Therapist asked that each patient check in with home life and recovery efforts and identify triggers, cravings, and high risk situations that arise between group sessions. Therapist provided empathy and support during group session.     Session Content/Coping Skills: JASON Newman student, attended session. Deja  joined for first part of meeting. Andrew [facility] Liaison also attended for first part of session. Check ins completed by group members. Ice Breaker activity of 2 Truths and a Lie completed to build group rapport. Individual check in meetings completed with each group member in attendance to review progress in CD-IOP. Group members were shown where CD-IOP Phase II meets in the hospital. [employer] video “The Fascinating Neuroscience of Music” viewed and discussed ([employer]). JASON Newman student, facilitated discussion of music. Article “8 ways that music can help support your recovery” reviewed and discussed (Retrieved from [email or web address]). Group members shared songs that have meaning to them. These songs were played and discussed as a group.     Response: Patient attended class in person. Patient participated in completion of check in form. Patient on check in form answered no to question of “currently or since your last group meeting, have you had any suicidal thoughts or plan or intent to hurt yourself”, and patient also answered no on check in form to question of “currently or since your last group meeting, have you had any homicidal thoughts or plan or intent to hurt others”.     Patient during  individual check in denied suicidal or homicidal thoughts.     Personal Assessment 0-10 Scale (0-none, 10-high)    Anxiety:  1   Depression:  0   Cravings: 3     Assessment:     ..  Lab on 01/31/2024   Component Date Value Ref Range Status    PH 01/31/2024 5.7  4.5 - 9 Final    CREATININE 01/31/2024 245.6  20 - 300 mg/dL mg/dL Final    SPECIFIC GRAVITY 01/31/2024 1.034  1.003 - 1.035 Final    CODEINE 01/31/2024 Negative  50 ng/ml ng/ml Final    HYDROCODONE 01/31/2024 Negative  50 ng/ml ng/ml Final    NORHYDROCODONE 01/31/2024 Negative  50 ng/ml ng/ml Final    HYDROMORPHONE 01/31/2024 Negative  50 ng/ml ng/ml Final    MORPHINE 01/31/2024 Negative  50 ng/ml ng/ml Final    FENTANYL 01/31/2024 Negative  2 ng/ml ng/ml Final    NORFENTANYL 01/31/2024 Negative  10 ng/ml ng/ml Final    METHADONE 01/31/2024 Negative  25 ng/ml ng/ml Final    EDDP 01/31/2024 Negative  50 ng/ml ng/ml Final    OXYCODONE 01/31/2024 Negative  50 ng/ml ng/ml Final    NOROXYCODONE 01/31/2024 Negative  50 ng/ml ng/ml Final    OXYMORPHONE 01/31/2024 Negative  50 ng/ml ng/ml Final    TAPENTADOL 01/31/2024 Negative  50 ng/ml ng/ml Final    TRAMADOL 01/31/2024 Negative  50 ng/ml ng/ml Final    BUPRENORPHINE 01/31/2024 Negative  7.5 ng/ml ng/ml Final    NORBUPRENORPHINE 01/31/2024 Negative  10 ng/ml ng/ml Final    AMOBARBITAL 01/31/2024 Negative  100 ng/ml ng/ml Final    BUTABARBITAL 01/31/2024 Negative  100 ng/ml ng/ml Final    BUTALBITAL 01/31/2024 Negative  100 ng/ml ng/ml Final    PHENOBARBITAL 01/31/2024 Negative  100 ng/ml ng/ml Final    SECOBARBITAL 01/31/2024 Negative  100 ng/ml ng/ml Final    ALPRAZOLAM 01/31/2024 Negative  50 ng/ml ng/ml Final    ALPHA-HYDROXYALPRAZOLAM 01/31/2024 Negative  50 ng/ml ng/ml Final    CLONAZEPAM 01/31/2024 Negative  50 ng/ml ng/ml Final    7- AMINOCLONAZEPAM 01/31/2024 Negative  50 ng/ml ng/ml Final    DIAZEPAM 01/31/2024 Negative  50 ng/ml ng/ml Final    NORDIAZEPAM 01/31/2024 Negative  50 ng/ml ng/ml Final     LORAZEPAM 01/31/2024 Negative  100 ng/ml ng/ml Final    MIDAZOLAM 01/31/2024 Negative  50 ng/ml ng/ml Final    ALPHA-HYDROXYMIDAZOLAM 01/31/2024 Negative  50 ng/ml ng/ml Final    OXAZEPAM 01/31/2024 Negative  50 ng/ml ng/ml Final    TEMAZEPAM 01/31/2024 Negative  50 ng/ml ng/ml Final    ETG 01/31/2024 Negative  200 ng/ml ng/ml Final    ETS 01/31/2024 Negative  50 ng/ml ng/ml Final    BENZOYLECGONINE 01/31/2024 Negative  100 ng/ml ng/ml Final    6-DEJUAN 01/31/2024 Negative  10 ng/ml ng/ml Final    MDMA 01/31/2024 Negative  100 ng/ml ng/ml Final    PCP 01/31/2024 Negative  20 ng/ml ng/ml Final    DELTA-9-THC 01/31/2024 Negative  20 ng/ml ng/ml Final    AMPHETAMINE 01/31/2024 >2500 (C)  250 ng/ml ng/ml Final    METHAMPHETAMINE 01/31/2024 >2000 (C)  100 ng/ml ng/ml Final    METHYLPHENIDATE 01/31/2024 Negative  10 ng/ml ng/ml Final    PHENTERMINE 01/31/2024 Negative  100 ng/ml ng/ml Final    RITALINIC ACID 01/31/2024 Negative  50 ng/ml ng/ml Final    CARISOPRODOL 01/31/2024 Negative  100 ng/ml ng/ml Final    GABAPENTIN 01/31/2024 Negative  500 ng/ml ng/ml Final    PREGABALIN 01/31/2024 Negative  250 ng/ml ng/ml Final    ZOLPIDEM 01/31/2024 Negative  2 ng/ml ng/ml Final    CARBOXYZOLPIDEM 01/31/2024 Negative  10 ng/ml ng/ml Final    THC, Screen, Urine 01/31/2024 Negative  Negative Final    Phencyclidine (PCP), Urine 01/31/2024 Negative  Negative Final    Cocaine Screen, Urine 01/31/2024 Negative  Negative Final    Methamphetamine, Ur 01/31/2024 Positive (A)  Negative Final    Opiate Screen 01/31/2024 Negative  Negative Final    Amphetamine Screen, Urine 01/31/2024 Positive (A)  Negative Final    Benzodiazepine Screen, Urine 01/31/2024 Negative  Negative Final    Tricyclic Antidepressants Screen 01/31/2024 Negative  Negative Final    Methadone Screen, Urine 01/31/2024 Negative  Negative Final    Barbiturates Screen, Urine 01/31/2024 Negative  Negative Final    Oxycodone Screen, Urine 01/31/2024 Negative  Negative Final     Buprenorphine, Screen, Urine 01/31/2024 Negative  Negative Final   Lab on 01/22/2024   Component Date Value Ref Range Status    Glucose 01/22/2024 119 (H)  65 - 99 mg/dL Final    BUN 01/22/2024 14  6 - 20 mg/dL Final    Creatinine 01/22/2024 1.02  0.76 - 1.27 mg/dL Final    Sodium 01/22/2024 137  136 - 145 mmol/L Final    Potassium 01/22/2024 4.4  3.5 - 5.2 mmol/L Final    Chloride 01/22/2024 103  98 - 107 mmol/L Final    CO2 01/22/2024 24.0  22.0 - 29.0 mmol/L Final    Calcium 01/22/2024 9.0  8.6 - 10.5 mg/dL Final    Total Protein 01/22/2024 8.0  6.0 - 8.5 g/dL Final    Albumin 01/22/2024 4.5  3.5 - 5.2 g/dL Final    ALT (SGPT) 01/22/2024 31  1 - 41 U/L Final    AST (SGOT) 01/22/2024 27  1 - 40 U/L Final    Alkaline Phosphatase 01/22/2024 62  39 - 117 U/L Final    Total Bilirubin 01/22/2024 0.3  0.0 - 1.2 mg/dL Final    Globulin 01/22/2024 3.5  gm/dL Final    A/G Ratio 01/22/2024 1.3  g/dL Final    BUN/Creatinine Ratio 01/22/2024 13.7  7.0 - 25.0 Final    Anion Gap 01/22/2024 10.0  5.0 - 15.0 mmol/L Final    eGFR 01/22/2024 92.9  >60.0 mL/min/1.73 Final    Protime 01/22/2024 12.8  12.3 - 15.1 Seconds Final    INR 01/22/2024 0.92  0.90 - 1.10 Final    HCV, RNA, QUANT 01/22/2024 HCV Not Detected  IU/mL Final    No evidence of active HCV infection.    Test Information 01/22/2024 Comment   Final    The quantitative range of this assay is 15 IU/mL to 100 million IU/mL.    WBC 01/22/2024 5.10  3.40 - 10.80 10*3/mm3 Final    RBC 01/22/2024 5.23  4.14 - 5.80 10*6/mm3 Final    Hemoglobin 01/22/2024 13.2  13.0 - 17.7 g/dL Final    Hematocrit 01/22/2024 41.6  37.5 - 51.0 % Final    MCV 01/22/2024 79.5  79.0 - 97.0 fL Final    MCH 01/22/2024 25.2 (L)  26.6 - 33.0 pg Final    MCHC 01/22/2024 31.7  31.5 - 35.7 g/dL Final    RDW 01/22/2024 17.6 (H)  12.3 - 15.4 % Final    RDW-SD 01/22/2024 50.0  37.0 - 54.0 fl Final    MPV 01/22/2024    Final    Unable to calculate    Platelets 01/22/2024 85 (L)  140 - 450 10*3/mm3 Final        Mental Status Exam  Hygiene:  good  Dress: casual  Attitude: cooperative and agreeable   Motor Activity: appropriate  Eye Contact:  good  Speech: regular rate and rhythm   Mood:  calm and cooperative  Affect:  Appropriate  Thought Processes:  Linear  Thought Content:  Normal  Suicidal Thoughts:  denies  Homicidal Thoughts:  denies  Crisis Safety Plan: Safety plan has been discussed.   Hallucinations:  Unknown to clinician.   Reliability: fair  Insight: fair  Judgement: fair  Impulse Control: fair    Recovery/spiritual support group attendance: No.     Progress toward goal: Not at goal    Prognosis: Fair with Ongoing Treatment     Self-reported number of days sober: Patient on check in form reported 7.     Patient will contact this office, call 911 or present to the nearest emergency room should suicidal or homicidal ideations occur.    Impression/Formulation:    ICD-10-CM ICD-9-CM   1. Opioid use disorder, severe, in sustained remission  F11.21 304.03   2. Alcohol use disorder, severe, in sustained remission  F10.21 303.93   3. Methamphetamine use disorder, severe, dependence  F15.20 304.40   4. Cannabis use disorder, mild, abuse  F12.10 305.20       Clinical Maneuvering/Interventions: Therapist utilized a person-centered approach to build rapport with group member. Therapist implemented motivational interviewing techniques to assist client with exploring and resolving ambivalence associated with commitment to change behaviors related to substance use and addiction. Therapist applied cognitive behavioral strategies to facilitate identification of maladaptive patterns of thinking and behavior that contribute to client's risk for continued substance use and relapse. Therapist employed group interaction activities to build rapport among group members, promote sobriety, and emphasize relapse prevention. Therapist promoted safe nonjudgmental environment by providing group members with unconditional positive regard  and encouraging group members to comply with group rules and guidelines. Therapist assisted group member with identifying and implementing healthier coping strategies.      Plan:  Continue [facility] IOP Phase I   Aftercare:  [facility] Phase II  Program Assignments:  Personal recovery plan, relapse prevention plan, attendance of recovery support group meetings, exploration of sponsorship, drug/alcohol screens.     Octaviano Crawley LCSW  2/7/2024  20:05 EST

## 2024-02-13 LAB
1OH-MIDAZOLAM UR CFM-MCNC: NEGATIVE NG/ML
6MAM UR CFM-MCNC: NEGATIVE NG/ML
7AMINOCLONAZEPAM UR CFM-MCNC: NEGATIVE NG/ML
7AMINOCLONAZEPAM UR CFM-MCNC: NEGATIVE NG/ML
A-OH ALPRAZ UR CFM-MCNC: NEGATIVE NG/ML
ALPRAZ UR CFM-MCNC: NEGATIVE NG/ML
AMOBARBITAL UR CFM-MCNC: NEGATIVE NG/ML
AMPHET UR CFM-MCNC: 2102 NG/ML
BUPRENORPHINE UR-MCNC: 431 NG/ML
BUTABARBITAL UR CFM-MCNC: NEGATIVE NG/ML
BUTALBITAL UR CFM-MCNC: NEGATIVE NG/ML
BZE UR CFM-MCNC: NEGATIVE NG/ML
CARBOXYTHC UR CFM-MCNC: NEGATIVE NG/ML
CARISOPRODOL UR CFM-MCNC: NEGATIVE NG/ML
CODEINE UR CFM-MCNC: NEGATIVE NG/ML
CREATININE: 235.9 MG/DL
DIAZEPAM UR CFM-MCNC: NEGATIVE NG/ML
EDDP UR CFM-MCNC: NEGATIVE NG/ML
ETHYL GLUCURONIDE UR CFM-MCNC: NEGATIVE NG/ML
ETS: NEGATIVE NG/ML
FENTANYL UR-MCNC: NEGATIVE NG/ML
GABAPENTIN UR CFM-MCNC: NEGATIVE NG/ML
HYDROCODONE UR CFM-MCNC: NEGATIVE NG/ML
HYDROMORPHONE UR CFM-MCNC: NEGATIVE NG/ML
LORAZEPAM UR CFM-MCNC: NEGATIVE NG/ML
MDMA UR CFM-MCNC: NEGATIVE NG/ML
ME-PHENIDATE UR CFM-MCNC: NEGATIVE NG/ML
METHADONE UR CFM-MCNC: NEGATIVE NG/ML
METHAMPHET UR CFM-MCNC: >2000 NG/ML
MIDAZOLAM UR CFM-MCNC: NEGATIVE NG/ML
MORPHINE UR CFM-MCNC: NEGATIVE NG/ML
NORBUPRENORPHINE UR CFM-MCNC: 74 NG/ML
NORDIAZEPAM UR CFM-MCNC: NEGATIVE NG/ML
NORFENTANYL UR CFM-MCNC: NEGATIVE NG/ML
NORHYDROCODONE UR CFM-MCNC: NEGATIVE NG/ML
NOROXYCODONE UR CFM-MCNC: NEGATIVE NG/ML
OXAZEPAM CTO UR CFM-MCNC: NEGATIVE NG/ML
OXYCODONE SERPL-MCNC: NEGATIVE NG/ML
OXYMORPHONE SERPL-MCNC: NEGATIVE NG/ML
PCP UR CFM-MCNC: NEGATIVE NG/ML
PH: 6.4 (ref 4.5–9)
PHENOBARB UR CFM-MCNC: NEGATIVE NG/ML
PHENTERMINE: NEGATIVE NG/ML
PPAA UR CFM-MCNC: NEGATIVE NG/ML
PREGABALIN UR CFM-MCNC: NEGATIVE NG/ML
SECOBARBITAL UR CFM-MCNC: NEGATIVE NG/ML
SPECIFIC GRAVITY: 1.02 (ref 1–1.03)
TAPENTADOL UR CFM-MCNC: NEGATIVE NG/ML
TEMAZEPAM UR CFM-MCNC: NEGATIVE NG/ML
TRAMADOL: NEGATIVE NG/ML
ZOLPIDEM PHENYL-4-CARB UR CFM-MCNC: NEGATIVE NG/ML
ZOLPIDEM UR CFM-MCNC: NEGATIVE NG/ML

## 2024-02-15 ENCOUNTER — LAB (OUTPATIENT)
Dept: LAB | Facility: HOSPITAL | Age: 44
End: 2024-02-15
Payer: COMMERCIAL

## 2024-02-15 DIAGNOSIS — F15.20 METHAMPHETAMINE USE DISORDER, SEVERE, DEPENDENCE: ICD-10-CM

## 2024-02-15 LAB
AMPHET+METHAMPHET UR QL: NEGATIVE
AMPHETAMINES UR QL: POSITIVE
BARBITURATES UR QL SCN: NEGATIVE
BENZODIAZ UR QL SCN: NEGATIVE
BUPRENORPHINE SERPL-MCNC: POSITIVE NG/ML
CANNABINOIDS SERPL QL: NEGATIVE
COCAINE UR QL: NEGATIVE
METHADONE UR QL SCN: NEGATIVE
OPIATES UR QL: NEGATIVE
OXYCODONE UR QL SCN: NEGATIVE
PCP UR QL SCN: NEGATIVE
TRICYCLICS UR QL SCN: NEGATIVE

## 2024-02-15 PROCEDURE — 80306 DRUG TEST PRSMV INSTRMNT: CPT

## 2024-02-19 ENCOUNTER — OFFICE VISIT (OUTPATIENT)
Dept: PSYCHIATRY | Facility: HOSPITAL | Age: 44
End: 2024-02-19
Payer: COMMERCIAL

## 2024-02-19 DIAGNOSIS — F15.20 METHAMPHETAMINE USE DISORDER, SEVERE, DEPENDENCE: ICD-10-CM

## 2024-02-19 DIAGNOSIS — F10.21 ALCOHOL USE DISORDER, SEVERE, IN SUSTAINED REMISSION: ICD-10-CM

## 2024-02-19 DIAGNOSIS — F11.21 OPIOID USE DISORDER, SEVERE, IN SUSTAINED REMISSION: Primary | ICD-10-CM

## 2024-02-19 DIAGNOSIS — F12.10 CANNABIS USE DISORDER, MILD, ABUSE: ICD-10-CM

## 2024-02-19 PROCEDURE — H0015 ALCOHOL AND/OR DRUG SERVICES: HCPCS | Performed by: NURSE PRACTITIONER

## 2024-02-21 ENCOUNTER — DOCUMENTATION (OUTPATIENT)
Dept: PSYCHIATRY | Facility: HOSPITAL | Age: 44
End: 2024-02-21
Payer: COMMERCIAL

## 2024-02-21 LAB
1OH-MIDAZOLAM UR CFM-MCNC: NEGATIVE NG/ML
6MAM UR CFM-MCNC: NEGATIVE NG/ML
7AMINOCLONAZEPAM UR CFM-MCNC: NEGATIVE NG/ML
7AMINOCLONAZEPAM UR CFM-MCNC: NEGATIVE NG/ML
A-OH ALPRAZ UR CFM-MCNC: NEGATIVE NG/ML
ALPRAZ UR CFM-MCNC: NEGATIVE NG/ML
AMOBARBITAL UR CFM-MCNC: NEGATIVE NG/ML
AMPHET UR CFM-MCNC: >2500 NG/ML
BUPRENORPHINE UR-MCNC: 24 NG/ML
BUTABARBITAL UR CFM-MCNC: NEGATIVE NG/ML
BUTALBITAL UR CFM-MCNC: NEGATIVE NG/ML
BZE UR CFM-MCNC: NEGATIVE NG/ML
CARBOXYTHC UR CFM-MCNC: NEGATIVE NG/ML
CARISOPRODOL UR CFM-MCNC: NEGATIVE NG/ML
CODEINE UR CFM-MCNC: NEGATIVE NG/ML
CREATININE: 215 MG/DL
DIAZEPAM UR CFM-MCNC: NEGATIVE NG/ML
EDDP UR CFM-MCNC: NEGATIVE NG/ML
ETHYL GLUCURONIDE UR CFM-MCNC: NEGATIVE NG/ML
ETS: NEGATIVE NG/ML
FENTANYL UR-MCNC: NEGATIVE NG/ML
GABAPENTIN UR CFM-MCNC: NEGATIVE NG/ML
HYDROCODONE UR CFM-MCNC: NEGATIVE NG/ML
HYDROMORPHONE UR CFM-MCNC: NEGATIVE NG/ML
LORAZEPAM UR CFM-MCNC: NEGATIVE NG/ML
MDMA UR CFM-MCNC: NEGATIVE NG/ML
ME-PHENIDATE UR CFM-MCNC: NEGATIVE NG/ML
METHADONE UR CFM-MCNC: NEGATIVE NG/ML
METHAMPHET UR CFM-MCNC: >2000 NG/ML
MIDAZOLAM UR CFM-MCNC: NEGATIVE NG/ML
MORPHINE UR CFM-MCNC: NEGATIVE NG/ML
NORBUPRENORPHINE UR CFM-MCNC: 33 NG/ML
NORDIAZEPAM UR CFM-MCNC: NEGATIVE NG/ML
NORFENTANYL UR CFM-MCNC: NEGATIVE NG/ML
NORHYDROCODONE UR CFM-MCNC: NEGATIVE NG/ML
NOROXYCODONE UR CFM-MCNC: NEGATIVE NG/ML
OXAZEPAM CTO UR CFM-MCNC: NEGATIVE NG/ML
OXYCODONE SERPL-MCNC: NEGATIVE NG/ML
OXYMORPHONE SERPL-MCNC: NEGATIVE NG/ML
PCP UR CFM-MCNC: NEGATIVE NG/ML
PH: 6.6 (ref 4.5–9)
PHENOBARB UR CFM-MCNC: NEGATIVE NG/ML
PHENTERMINE: NEGATIVE NG/ML
PPAA UR CFM-MCNC: NEGATIVE NG/ML
PREGABALIN UR CFM-MCNC: NEGATIVE NG/ML
SECOBARBITAL UR CFM-MCNC: NEGATIVE NG/ML
SPECIFIC GRAVITY: 1.03 (ref 1–1.03)
TAPENTADOL UR CFM-MCNC: NEGATIVE NG/ML
TEMAZEPAM UR CFM-MCNC: NEGATIVE NG/ML
TRAMADOL: NEGATIVE NG/ML
ZOLPIDEM PHENYL-4-CARB UR CFM-MCNC: NEGATIVE NG/ML
ZOLPIDEM UR CFM-MCNC: NEGATIVE NG/ML

## 2024-02-21 NOTE — PROGRESS NOTES
Clinician this date (2/21/2024) spoke with patient before the start of CD-IOP in behavioral health office at approximately 06:00 PM. Clinician advised patient that due to positive UDS screens, we would have to discharge and recommend a higher level of care. Clinician explained that if patient were to go to inpatient residential treatment, we would welcome him back into IOP upon completion and asked patient to consider this.     Patient will be discharged from CD-IOP.     -Octaviano Crawley LCSW.   2/21/2024.

## 2024-02-26 NOTE — PROGRESS NOTES
[facility] IOP GROUP     Date: 02/19/2024  Name: Nilson Miller    Time In: 1800   Time Out: 2053     Number of participants: 9    IOP GROUP NOTE     Data: 3 hour IOP group therapy session (Check-ins, Coping Skills, Relapse Prevention)     Check Ins: Therapist continued facilitation of rapport building strategies between group members. Therapist asked that each patient check in with home life and recovery efforts and identify triggers, cravings, and high risk situations that arise between group sessions. Therapist provided empathy and support during group session.     Session Content/Coping Skills: JASON Newman student, present for session. Andrew [facility] Liaison also attended first part of session. Introductions completed. Check ins completed by group members. Andrew shared and processed just for today reading with group members. Living in Balance- Session 3 completed. Group members participated in a triggers practice exercise. Paty facilitated discussion regarding stress management and Taking the Escalator, stress management activity completed. Stressors discussed and processed as a group.     Response: Patient attended class in person. Patient participated in completion of check in form. Patient on check in form answered no to question of “currently or since your last group meeting, have you had any suicidal thoughts or plan or intent to hurt yourself”, and patient also answered no on check in form to question of “currently or since your last group meeting, have you had any homicidal thoughts or plan or intent to hurt others”.     Personal Assessment 0-10 Scale (0-none, 10-high)    Anxiety:  3   Depression:  0   Cravings: 3     Assessment:     ..  Lab on 02/15/2024   Component Date Value Ref Range Status    THC, Screen, Urine 02/15/2024 Negative  Negative Final    Phencyclidine (PCP), Urine 02/15/2024 Negative  Negative Final    Cocaine Screen, Urine 02/15/2024 Negative   Negative Final    Methamphetamine, Ur 02/15/2024 Positive (A)  Negative Final    Opiate Screen 02/15/2024 Negative  Negative Final    Amphetamine Screen, Urine 02/15/2024 Negative  Negative Final    Benzodiazepine Screen, Urine 02/15/2024 Negative  Negative Final    Tricyclic Antidepressants Screen 02/15/2024 Negative  Negative Final    Methadone Screen, Urine 02/15/2024 Negative  Negative Final    Barbiturates Screen, Urine 02/15/2024 Negative  Negative Final    Oxycodone Screen, Urine 02/15/2024 Negative  Negative Final    Buprenorphine, Screen, Urine 02/15/2024 Positive (A)  Negative Final    PH 02/15/2024 6.6  4.5 - 9 Final    CREATININE 02/15/2024 215.0  20 - 300 mg/dL mg/dL Final    SPECIFIC GRAVITY 02/15/2024 1.031  1.003 - 1.035 Final    CODEINE 02/15/2024 Negative  50 ng/ml ng/ml Final    HYDROCODONE 02/15/2024 Negative  50 ng/ml ng/ml Final    NORHYDROCODONE 02/15/2024 Negative  50 ng/ml ng/ml Final    HYDROMORPHONE 02/15/2024 Negative  50 ng/ml ng/ml Final    MORPHINE 02/15/2024 Negative  50 ng/ml ng/ml Final    FENTANYL 02/15/2024 Negative  2 ng/ml ng/ml Final    NORFENTANYL 02/15/2024 Negative  10 ng/ml ng/ml Final    METHADONE 02/15/2024 Negative  25 ng/ml ng/ml Final    EDDP 02/15/2024 Negative  50 ng/ml ng/ml Final    OXYCODONE 02/15/2024 Negative  50 ng/ml ng/ml Final    NOROXYCODONE 02/15/2024 Negative  50 ng/ml ng/ml Final    OXYMORPHONE 02/15/2024 Negative  50 ng/ml ng/ml Final    TAPENTADOL 02/15/2024 Negative  50 ng/ml ng/ml Final    TRAMADOL 02/15/2024 Negative  50 ng/ml ng/ml Final    BUPRENORPHINE 02/15/2024 24 (C)  7.5 ng/ml ng/ml Final    NORBUPRENORPHINE 02/15/2024 33 (C)  10 ng/ml ng/ml Final    AMOBARBITAL 02/15/2024 Negative  100 ng/ml ng/ml Final    BUTABARBITAL 02/15/2024 Negative  100 ng/ml ng/ml Final    BUTALBITAL 02/15/2024 Negative  100 ng/ml ng/ml Final    PHENOBARBITAL 02/15/2024 Negative  100 ng/ml ng/ml Final    SECOBARBITAL 02/15/2024 Negative  100 ng/ml ng/ml Final     ALPRAZOLAM 02/15/2024 Negative  50 ng/ml ng/ml Final    ALPHA-HYDROXYALPRAZOLAM 02/15/2024 Negative  50 ng/ml ng/ml Final    CLONAZEPAM 02/15/2024 Negative  50 ng/ml ng/ml Final    7- AMINOCLONAZEPAM 02/15/2024 Negative  50 ng/ml ng/ml Final    DIAZEPAM 02/15/2024 Negative  50 ng/ml ng/ml Final    NORDIAZEPAM 02/15/2024 Negative  50 ng/ml ng/ml Final    LORAZEPAM 02/15/2024 Negative  100 ng/ml ng/ml Final    MIDAZOLAM 02/15/2024 Negative  50 ng/ml ng/ml Final    ALPHA-HYDROXYMIDAZOLAM 02/15/2024 Negative  50 ng/ml ng/ml Final    OXAZEPAM 02/15/2024 Negative  50 ng/ml ng/ml Final    TEMAZEPAM 02/15/2024 Negative  50 ng/ml ng/ml Final    ETG 02/15/2024 Negative  200 ng/ml ng/ml Final    ETS 02/15/2024 Negative  50 ng/ml ng/ml Final    BENZOYLECGONINE 02/15/2024 Negative  100 ng/ml ng/ml Final    6-DEJUAN 02/15/2024 Negative  10 ng/ml ng/ml Final    MDMA 02/15/2024 Negative  100 ng/ml ng/ml Final    PCP 02/15/2024 Negative  20 ng/ml ng/ml Final    DELTA-9-THC 02/15/2024 Negative  20 ng/ml ng/ml Final    AMPHETAMINE 02/15/2024 >2500 (C)  250 ng/ml ng/ml Final    METHAMPHETAMINE 02/15/2024 >2000 (C)  100 ng/ml ng/ml Final    METHYLPHENIDATE 02/15/2024 Negative  10 ng/ml ng/ml Final    PHENTERMINE 02/15/2024 Negative  100 ng/ml ng/ml Final    RITALINIC ACID 02/15/2024 Negative  50 ng/ml ng/ml Final    CARISOPRODOL 02/15/2024 Negative  100 ng/ml ng/ml Final    GABAPENTIN 02/15/2024 Negative  500 ng/ml ng/ml Final    PREGABALIN 02/15/2024 Negative  250 ng/ml ng/ml Final    ZOLPIDEM 02/15/2024 Negative  2 ng/ml ng/ml Final    CARBOXYZOLPIDEM 02/15/2024 Negative  10 ng/ml ng/ml Final   Lab on 02/07/2024   Component Date Value Ref Range Status    THC, Screen, Urine 02/07/2024 Negative  Negative Final    Phencyclidine (PCP), Urine 02/07/2024 Negative  Negative Final    Cocaine Screen, Urine 02/07/2024 Negative  Negative Final    Methamphetamine, Ur 02/07/2024 Positive (A)  Negative Final    Opiate Screen 02/07/2024 Negative   Negative Final    Amphetamine Screen, Urine 02/07/2024 Positive (A)  Negative Final    Benzodiazepine Screen, Urine 02/07/2024 Negative  Negative Final    Tricyclic Antidepressants Screen 02/07/2024 Negative  Negative Final    Methadone Screen, Urine 02/07/2024 Negative  Negative Final    Barbiturates Screen, Urine 02/07/2024 Negative  Negative Final    Oxycodone Screen, Urine 02/07/2024 Negative  Negative Final    Buprenorphine, Screen, Urine 02/07/2024 Positive (A)  Negative Final    PH 02/07/2024 6.4  4.5 - 9 Final    CREATININE 02/07/2024 235.9  20 - 300 mg/dL mg/dL Final    SPECIFIC GRAVITY 02/07/2024 1.023  1.003 - 1.035 Final    CODEINE 02/07/2024 Negative  50 ng/ml ng/ml Final    HYDROCODONE 02/07/2024 Negative  50 ng/ml ng/ml Final    NORHYDROCODONE 02/07/2024 Negative  50 ng/ml ng/ml Final    HYDROMORPHONE 02/07/2024 Negative  50 ng/ml ng/ml Final    MORPHINE 02/07/2024 Negative  50 ng/ml ng/ml Final    FENTANYL 02/07/2024 Negative  2 ng/ml ng/ml Final    NORFENTANYL 02/07/2024 Negative  10 ng/ml ng/ml Final    METHADONE 02/07/2024 Negative  25 ng/ml ng/ml Final    EDDP 02/07/2024 Negative  50 ng/ml ng/ml Final    OXYCODONE 02/07/2024 Negative  50 ng/ml ng/ml Final    NOROXYCODONE 02/07/2024 Negative  50 ng/ml ng/ml Final    OXYMORPHONE 02/07/2024 Negative  50 ng/ml ng/ml Final    TAPENTADOL 02/07/2024 Negative  50 ng/ml ng/ml Final    TRAMADOL 02/07/2024 Negative  50 ng/ml ng/ml Final    BUPRENORPHINE 02/07/2024 431 (C)  7.5 ng/ml ng/ml Final    NORBUPRENORPHINE 02/07/2024 74 (C)  10 ng/ml ng/ml Final    AMOBARBITAL 02/07/2024 Negative  100 ng/ml ng/ml Final    BUTABARBITAL 02/07/2024 Negative  100 ng/ml ng/ml Final    BUTALBITAL 02/07/2024 Negative  100 ng/ml ng/ml Final    PHENOBARBITAL 02/07/2024 Negative  100 ng/ml ng/ml Final    SECOBARBITAL 02/07/2024 Negative  100 ng/ml ng/ml Final    ALPRAZOLAM 02/07/2024 Negative  50 ng/ml ng/ml Final    ALPHA-HYDROXYALPRAZOLAM 02/07/2024 Negative  50 ng/ml ng/ml  Final    CLONAZEPAM 02/07/2024 Negative  50 ng/ml ng/ml Final    7- AMINOCLONAZEPAM 02/07/2024 Negative  50 ng/ml ng/ml Final    DIAZEPAM 02/07/2024 Negative  50 ng/ml ng/ml Final    NORDIAZEPAM 02/07/2024 Negative  50 ng/ml ng/ml Final    LORAZEPAM 02/07/2024 Negative  100 ng/ml ng/ml Final    MIDAZOLAM 02/07/2024 Negative  50 ng/ml ng/ml Final    ALPHA-HYDROXYMIDAZOLAM 02/07/2024 Negative  50 ng/ml ng/ml Final    OXAZEPAM 02/07/2024 Negative  50 ng/ml ng/ml Final    TEMAZEPAM 02/07/2024 Negative  50 ng/ml ng/ml Final    ETG 02/07/2024 Negative  200 ng/ml ng/ml Final    ETS 02/07/2024 Negative  50 ng/ml ng/ml Final    BENZOYLECGONINE 02/07/2024 Negative  100 ng/ml ng/ml Final    6-DEJUAN 02/07/2024 Negative  10 ng/ml ng/ml Final    MDMA 02/07/2024 Negative  100 ng/ml ng/ml Final    PCP 02/07/2024 Negative  20 ng/ml ng/ml Final    DELTA-9-THC 02/07/2024 Negative  20 ng/ml ng/ml Final    AMPHETAMINE 02/07/2024 2102 (C)  250 ng/ml ng/ml Final    METHAMPHETAMINE 02/07/2024 >2000 (C)  100 ng/ml ng/ml Final    METHYLPHENIDATE 02/07/2024 Negative  10 ng/ml ng/ml Final    PHENTERMINE 02/07/2024 Negative  100 ng/ml ng/ml Final    RITALINIC ACID 02/07/2024 Negative  50 ng/ml ng/ml Final    CARISOPRODOL 02/07/2024 Negative  100 ng/ml ng/ml Final    GABAPENTIN 02/07/2024 Negative  500 ng/ml ng/ml Final    PREGABALIN 02/07/2024 Negative  250 ng/ml ng/ml Final    ZOLPIDEM 02/07/2024 Negative  2 ng/ml ng/ml Final    CARBOXYZOLPIDEM 02/07/2024 Negative  10 ng/ml ng/ml Final   Lab on 01/31/2024   Component Date Value Ref Range Status    PH 01/31/2024 5.7  4.5 - 9 Final    CREATININE 01/31/2024 245.6  20 - 300 mg/dL mg/dL Final    SPECIFIC GRAVITY 01/31/2024 1.034  1.003 - 1.035 Final    CODEINE 01/31/2024 Negative  50 ng/ml ng/ml Final    HYDROCODONE 01/31/2024 Negative  50 ng/ml ng/ml Final    NORHYDROCODONE 01/31/2024 Negative  50 ng/ml ng/ml Final    HYDROMORPHONE 01/31/2024 Negative  50 ng/ml ng/ml Final    MORPHINE 01/31/2024  Negative  50 ng/ml ng/ml Final    FENTANYL 01/31/2024 Negative  2 ng/ml ng/ml Final    NORFENTANYL 01/31/2024 Negative  10 ng/ml ng/ml Final    METHADONE 01/31/2024 Negative  25 ng/ml ng/ml Final    EDDP 01/31/2024 Negative  50 ng/ml ng/ml Final    OXYCODONE 01/31/2024 Negative  50 ng/ml ng/ml Final    NOROXYCODONE 01/31/2024 Negative  50 ng/ml ng/ml Final    OXYMORPHONE 01/31/2024 Negative  50 ng/ml ng/ml Final    TAPENTADOL 01/31/2024 Negative  50 ng/ml ng/ml Final    TRAMADOL 01/31/2024 Negative  50 ng/ml ng/ml Final    BUPRENORPHINE 01/31/2024 Negative  7.5 ng/ml ng/ml Final    NORBUPRENORPHINE 01/31/2024 Negative  10 ng/ml ng/ml Final    AMOBARBITAL 01/31/2024 Negative  100 ng/ml ng/ml Final    BUTABARBITAL 01/31/2024 Negative  100 ng/ml ng/ml Final    BUTALBITAL 01/31/2024 Negative  100 ng/ml ng/ml Final    PHENOBARBITAL 01/31/2024 Negative  100 ng/ml ng/ml Final    SECOBARBITAL 01/31/2024 Negative  100 ng/ml ng/ml Final    ALPRAZOLAM 01/31/2024 Negative  50 ng/ml ng/ml Final    ALPHA-HYDROXYALPRAZOLAM 01/31/2024 Negative  50 ng/ml ng/ml Final    CLONAZEPAM 01/31/2024 Negative  50 ng/ml ng/ml Final    7- AMINOCLONAZEPAM 01/31/2024 Negative  50 ng/ml ng/ml Final    DIAZEPAM 01/31/2024 Negative  50 ng/ml ng/ml Final    NORDIAZEPAM 01/31/2024 Negative  50 ng/ml ng/ml Final    LORAZEPAM 01/31/2024 Negative  100 ng/ml ng/ml Final    MIDAZOLAM 01/31/2024 Negative  50 ng/ml ng/ml Final    ALPHA-HYDROXYMIDAZOLAM 01/31/2024 Negative  50 ng/ml ng/ml Final    OXAZEPAM 01/31/2024 Negative  50 ng/ml ng/ml Final    TEMAZEPAM 01/31/2024 Negative  50 ng/ml ng/ml Final    ETG 01/31/2024 Negative  200 ng/ml ng/ml Final    ETS 01/31/2024 Negative  50 ng/ml ng/ml Final    BENZOYLECGONINE 01/31/2024 Negative  100 ng/ml ng/ml Final    6-DEJUAN 01/31/2024 Negative  10 ng/ml ng/ml Final    MDMA 01/31/2024 Negative  100 ng/ml ng/ml Final    PCP 01/31/2024 Negative  20 ng/ml ng/ml Final    DELTA-9-THC 01/31/2024 Negative  20 ng/ml ng/ml  Final    AMPHETAMINE 01/31/2024 >2500 (C)  250 ng/ml ng/ml Final    METHAMPHETAMINE 01/31/2024 >2000 (C)  100 ng/ml ng/ml Final    METHYLPHENIDATE 01/31/2024 Negative  10 ng/ml ng/ml Final    PHENTERMINE 01/31/2024 Negative  100 ng/ml ng/ml Final    RITALINIC ACID 01/31/2024 Negative  50 ng/ml ng/ml Final    CARISOPRODOL 01/31/2024 Negative  100 ng/ml ng/ml Final    GABAPENTIN 01/31/2024 Negative  500 ng/ml ng/ml Final    PREGABALIN 01/31/2024 Negative  250 ng/ml ng/ml Final    ZOLPIDEM 01/31/2024 Negative  2 ng/ml ng/ml Final    CARBOXYZOLPIDEM 01/31/2024 Negative  10 ng/ml ng/ml Final    THC, Screen, Urine 01/31/2024 Negative  Negative Final    Phencyclidine (PCP), Urine 01/31/2024 Negative  Negative Final    Cocaine Screen, Urine 01/31/2024 Negative  Negative Final    Methamphetamine, Ur 01/31/2024 Positive (A)  Negative Final    Opiate Screen 01/31/2024 Negative  Negative Final    Amphetamine Screen, Urine 01/31/2024 Positive (A)  Negative Final    Benzodiazepine Screen, Urine 01/31/2024 Negative  Negative Final    Tricyclic Antidepressants Screen 01/31/2024 Negative  Negative Final    Methadone Screen, Urine 01/31/2024 Negative  Negative Final    Barbiturates Screen, Urine 01/31/2024 Negative  Negative Final    Oxycodone Screen, Urine 01/31/2024 Negative  Negative Final    Buprenorphine, Screen, Urine 01/31/2024 Negative  Negative Final       Mental Status Exam  Hygiene:  good  Dress: casual  Attitude: cooperative and agreeable   Motor Activity: appropriate  Eye Contact:  good  Speech: regular rate and rhythm   Mood:  calm and cooperative  Affect:  Appropriate  Thought Processes:  Linear  Thought Content:  Normal  Suicidal Thoughts:  denies  Homicidal Thoughts:  denies  Crisis Safety Plan: Safety plan has been discussed.   Hallucinations:  Unknown to clinician.   Reliability: fair  Insight: fair  Judgement: fair  Impulse Control: fair    Recovery/spiritual support group attendance: Patient on check in form  reported 2.      Progress toward goal: Not at goal    Prognosis: Fair with Ongoing Treatment     Self-reported number of days sober: Patient on check in form reported 18.     Patient will contact this office, call 911 or present to the nearest emergency room should suicidal or homicidal ideations occur.    Impression/Formulation:    ICD-10-CM ICD-9-CM   1. Opioid use disorder, severe, in sustained remission  F11.21 304.03   2. Alcohol use disorder, severe, in sustained remission  F10.21 303.93   3. Methamphetamine use disorder, severe, dependence  F15.20 304.40   4. Cannabis use disorder, mild, abuse  F12.10 305.20       Clinical Maneuvering/Interventions: Therapist utilized a person-centered approach to build rapport with group member. Therapist implemented motivational interviewing techniques to assist client with exploring and resolving ambivalence associated with commitment to change behaviors related to substance use and addiction. Therapist applied cognitive behavioral strategies to facilitate identification of maladaptive patterns of thinking and behavior that contribute to client's risk for continued substance use and relapse. Therapist employed group interaction activities to build rapport among group members, promote sobriety, and emphasize relapse prevention. Therapist promoted safe nonjudgmental environment by providing group members with unconditional positive regard and encouraging group members to comply with group rules and guidelines. Therapist assisted group member with identifying and implementing healthier coping strategies.      Plan:  Continue [facility] IOP Phase I   Aftercare:  [facility] Phase II  Program Assignments:  Personal recovery plan, relapse prevention plan, attendance of recovery support group meetings, exploration of sponsorship, drug/alcohol screens.     Octaviano Crawley LCSW  2/26/2024  14:03 EST

## 2024-03-04 ENCOUNTER — DOCUMENTATION (OUTPATIENT)
Dept: PSYCHIATRY | Facility: HOSPITAL | Age: 44
End: 2024-03-04
Payer: COMMERCIAL

## 2024-03-04 NOTE — PROGRESS NOTES
[facility] [address], [address]  [phone]    CHEMICAL DEPENDENCY   INTENSIVE OUTPATIENT PROGRAM    PHASE I  DISCHARGE SUMMARY    ATTENDING: GARDENIA Casas.  THERAPIST: Octaviano Crawley LCSW.    DATE OF ADMISSION: 12/28/2023 (Date of first -IOP class completed).     DATE OF DISCHARGE: 2/21/2024.    REASON FOR ADMISSION: Nilson Miller was referred by Court and admitted to IOP Phase I for Opioid use disorder, severe, in sustained remission; Alcohol use disorder, severe, in sustained remission; Methamphetamine use disorder, severe, dependence; and Cannabis use disorder, mild, abuse.     SUMMARY OF CARE, TREATMENT, and SERVICES PROVIDED: Nilson Miller was assessed and determined to meet [facility] level of care on 12/21/2023. Pt began IOP on 12/28/2023 and completed 16 group therapy sessions. Pt had 4 unexcused and 3 excused absences.     Patient tested positive for Amphetamine and Methamphetamine on 1/8/2024. Patient tested positive for Amphetamine and Methamphetamine on 1/31/2024. Patient tested positive for Amphetamine, Methamphetamine, Buprenorphine, and Norbuprenorphine on 2/7/2024. Patient tested positive for Amphetamine, Methamphetamine, Buprenorphine, and Norbuprenorphine on 2/15/2024.      AFTERCARE PLAN: Patient was discharged from -IOP Phase One on 2/21/2024 due to multiple positive UDS while in [facility] and a higher level of care was recommended.       Current Outpatient Medications:     escitalopram (LEXAPRO) 5 MG tablet, TAKE 1 TABLET BY MOUTH DAILY, Disp: 30 tablet, Rfl: 0    ferrous sulfate 324 (65 Fe) MG tablet delayed-release EC tablet, Take 1 tablet by mouth Every Other Day., Disp: , Rfl:     furosemide (LASIX) 20 MG tablet, Take 1 tablet by mouth Daily., Disp: , Rfl:     Magnesium Oxide -Mg Supplement 400 (240 Mg) MG tablet, TAKE ONE TABLET BY MOUTH 2 TIMES DAILY, Disp: , Rfl:     omeprazole (priLOSEC) 20 MG capsule, Take 1 capsule by mouth Daily.,  Disp: , Rfl:     pantoprazole (PROTONIX) 40 MG EC tablet, Take 1 tablet by mouth Daily., Disp: , Rfl:     Sofosbuvir-Velpatasvir 400-100 MG tablet, Take 1 tablet by mouth Daily., Disp: , Rfl:     spironolactone (ALDACTONE) 50 MG tablet, Take 1 tablet by mouth Daily., Disp: , Rfl:     PROGNOSIS: Poor without continued care.     -Octaviano Crawley LCSW.   3/4/2024.

## 2024-04-12 ENCOUNTER — LAB (OUTPATIENT)
Dept: LAB | Facility: HOSPITAL | Age: 44
End: 2024-04-12
Payer: COMMERCIAL

## 2024-04-12 ENCOUNTER — TRANSCRIBE ORDERS (OUTPATIENT)
Dept: LAB | Facility: HOSPITAL | Age: 44
End: 2024-04-12
Payer: COMMERCIAL

## 2024-04-12 DIAGNOSIS — B18.2 CHRONIC HEPATITIS C WITH HEPATIC COMA: Primary | ICD-10-CM

## 2024-04-12 DIAGNOSIS — B18.2 CHRONIC HEPATITIS C WITH HEPATIC COMA: ICD-10-CM

## 2024-04-12 LAB
ALBUMIN SERPL-MCNC: 3.8 G/DL (ref 3.5–5.2)
ALBUMIN/GLOB SERPL: 1.2 G/DL
ALP SERPL-CCNC: 48 U/L (ref 39–117)
ALT SERPL W P-5'-P-CCNC: 27 U/L (ref 1–41)
ANION GAP SERPL CALCULATED.3IONS-SCNC: 9.6 MMOL/L (ref 5–15)
AST SERPL-CCNC: 45 U/L (ref 1–40)
BASOPHILS # BLD AUTO: 0.03 10*3/MM3 (ref 0–0.2)
BASOPHILS NFR BLD AUTO: 0.7 % (ref 0–1.5)
BILIRUB SERPL-MCNC: 0.8 MG/DL (ref 0–1.2)
BUN SERPL-MCNC: 21 MG/DL (ref 6–20)
BUN/CREAT SERPL: 17.6 (ref 7–25)
CALCIUM SPEC-SCNC: 9 MG/DL (ref 8.6–10.5)
CHLORIDE SERPL-SCNC: 104 MMOL/L (ref 98–107)
CO2 SERPL-SCNC: 25.4 MMOL/L (ref 22–29)
CREAT SERPL-MCNC: 1.19 MG/DL (ref 0.76–1.27)
DEPRECATED RDW RBC AUTO: 48.3 FL (ref 37–54)
EGFRCR SERPLBLD CKD-EPI 2021: 77.2 ML/MIN/1.73
EOSINOPHIL # BLD AUTO: 0.38 10*3/MM3 (ref 0–0.4)
EOSINOPHIL NFR BLD AUTO: 9.2 % (ref 0.3–6.2)
ERYTHROCYTE [DISTWIDTH] IN BLOOD BY AUTOMATED COUNT: 15.3 % (ref 12.3–15.4)
GLOBULIN UR ELPH-MCNC: 3.3 GM/DL
GLUCOSE SERPL-MCNC: 93 MG/DL (ref 65–99)
HCT VFR BLD AUTO: 38.1 % (ref 37.5–51)
HGB BLD-MCNC: 12.5 G/DL (ref 13–17.7)
INR PPP: 1.02 (ref 0.9–1.1)
LYMPHOCYTES # BLD AUTO: 1 10*3/MM3 (ref 0.7–3.1)
LYMPHOCYTES NFR BLD AUTO: 24.2 % (ref 19.6–45.3)
MCH RBC QN AUTO: 28.8 PG (ref 26.6–33)
MCHC RBC AUTO-ENTMCNC: 32.8 G/DL (ref 31.5–35.7)
MCV RBC AUTO: 87.8 FL (ref 79–97)
MONOCYTES # BLD AUTO: 0.49 10*3/MM3 (ref 0.1–0.9)
MONOCYTES NFR BLD AUTO: 11.9 % (ref 5–12)
NEUTROPHILS NFR BLD AUTO: 2.21 10*3/MM3 (ref 1.7–7)
NEUTROPHILS NFR BLD AUTO: 53.5 % (ref 42.7–76)
PLATELET # BLD AUTO: 80 10*3/MM3 (ref 140–450)
PMV BLD AUTO: 12.5 FL (ref 6–12)
POTASSIUM SERPL-SCNC: 4.1 MMOL/L (ref 3.5–5.2)
PROT SERPL-MCNC: 7.1 G/DL (ref 6–8.5)
PROTHROMBIN TIME: 13.9 SECONDS (ref 12.3–15.1)
RBC # BLD AUTO: 4.34 10*6/MM3 (ref 4.14–5.8)
SODIUM SERPL-SCNC: 139 MMOL/L (ref 136–145)
WBC NRBC COR # BLD AUTO: 4.13 10*3/MM3 (ref 3.4–10.8)

## 2024-04-12 PROCEDURE — 36415 COLL VENOUS BLD VENIPUNCTURE: CPT

## 2024-04-12 PROCEDURE — 85610 PROTHROMBIN TIME: CPT

## 2024-04-12 PROCEDURE — 85025 COMPLETE CBC W/AUTO DIFF WBC: CPT

## 2024-04-12 PROCEDURE — 80053 COMPREHEN METABOLIC PANEL: CPT

## 2024-04-12 PROCEDURE — 87522 HEPATITIS C REVRS TRNSCRPJ: CPT

## 2024-04-16 LAB
HCV RNA SERPL NAA+PROBE-ACNC: NORMAL IU/ML
TEST INFORMATION: NORMAL

## 2024-05-20 ENCOUNTER — LAB (OUTPATIENT)
Dept: LAB | Facility: HOSPITAL | Age: 44
End: 2024-05-20
Payer: COMMERCIAL

## 2024-05-20 ENCOUNTER — TRANSCRIBE ORDERS (OUTPATIENT)
Dept: LAB | Facility: HOSPITAL | Age: 44
End: 2024-05-20
Payer: COMMERCIAL

## 2024-05-20 DIAGNOSIS — B18.2 HEP C W/O COMA, CHRONIC: Primary | ICD-10-CM

## 2024-05-20 DIAGNOSIS — B18.2 HEP C W/O COMA, CHRONIC: ICD-10-CM

## 2024-05-20 LAB
BASOPHILS # BLD AUTO: 0.03 10*3/MM3 (ref 0–0.2)
BASOPHILS NFR BLD AUTO: 0.8 % (ref 0–1.5)
DEPRECATED RDW RBC AUTO: 44.3 FL (ref 37–54)
EOSINOPHIL # BLD AUTO: 0.19 10*3/MM3 (ref 0–0.4)
EOSINOPHIL NFR BLD AUTO: 4.8 % (ref 0.3–6.2)
ERYTHROCYTE [DISTWIDTH] IN BLOOD BY AUTOMATED COUNT: 14.3 % (ref 12.3–15.4)
HCT VFR BLD AUTO: 43.3 % (ref 37.5–51)
HGB BLD-MCNC: 14.7 G/DL (ref 13–17.7)
IMM GRANULOCYTES # BLD AUTO: 0.01 10*3/MM3 (ref 0–0.05)
IMM GRANULOCYTES NFR BLD AUTO: 0.3 % (ref 0–0.5)
INR PPP: 1.05 (ref 0.9–1.1)
LYMPHOCYTES # BLD AUTO: 1.43 10*3/MM3 (ref 0.7–3.1)
LYMPHOCYTES NFR BLD AUTO: 36.2 % (ref 19.6–45.3)
MCH RBC QN AUTO: 29.2 PG (ref 26.6–33)
MCHC RBC AUTO-ENTMCNC: 33.9 G/DL (ref 31.5–35.7)
MCV RBC AUTO: 86.1 FL (ref 79–97)
MONOCYTES # BLD AUTO: 0.33 10*3/MM3 (ref 0.1–0.9)
MONOCYTES NFR BLD AUTO: 8.4 % (ref 5–12)
NEUTROPHILS NFR BLD AUTO: 1.96 10*3/MM3 (ref 1.7–7)
NEUTROPHILS NFR BLD AUTO: 49.5 % (ref 42.7–76)
NRBC BLD AUTO-RTO: 0 /100 WBC (ref 0–0.2)
PLATELET # BLD AUTO: 78 10*3/MM3 (ref 140–450)
PMV BLD AUTO: 11.9 FL (ref 6–12)
PROTHROMBIN TIME: 14.3 SECONDS (ref 12.3–15.1)
RBC # BLD AUTO: 5.03 10*6/MM3 (ref 4.14–5.8)
WBC NRBC COR # BLD AUTO: 3.95 10*3/MM3 (ref 3.4–10.8)

## 2024-05-20 PROCEDURE — 80053 COMPREHEN METABOLIC PANEL: CPT

## 2024-05-20 PROCEDURE — 87522 HEPATITIS C REVRS TRNSCRPJ: CPT

## 2024-05-20 PROCEDURE — 36415 COLL VENOUS BLD VENIPUNCTURE: CPT

## 2024-05-20 PROCEDURE — 85610 PROTHROMBIN TIME: CPT

## 2024-05-20 PROCEDURE — 85025 COMPLETE CBC W/AUTO DIFF WBC: CPT

## 2024-05-21 LAB
ALBUMIN SERPL-MCNC: 4.5 G/DL (ref 3.5–5.2)
ALBUMIN/GLOB SERPL: 1.3 G/DL
ALP SERPL-CCNC: 59 U/L (ref 39–117)
ALT SERPL W P-5'-P-CCNC: 28 U/L (ref 1–41)
ANION GAP SERPL CALCULATED.3IONS-SCNC: 12.9 MMOL/L (ref 5–15)
AST SERPL-CCNC: 26 U/L (ref 1–40)
BILIRUB SERPL-MCNC: 0.9 MG/DL (ref 0–1.2)
BUN SERPL-MCNC: 13 MG/DL (ref 6–20)
BUN/CREAT SERPL: 12.4 (ref 7–25)
CALCIUM SPEC-SCNC: 9.4 MG/DL (ref 8.6–10.5)
CHLORIDE SERPL-SCNC: 103 MMOL/L (ref 98–107)
CO2 SERPL-SCNC: 22.1 MMOL/L (ref 22–29)
CREAT SERPL-MCNC: 1.05 MG/DL (ref 0.76–1.27)
EGFRCR SERPLBLD CKD-EPI 2021: 89.8 ML/MIN/1.73
GLOBULIN UR ELPH-MCNC: 3.6 GM/DL
GLUCOSE SERPL-MCNC: 99 MG/DL (ref 65–99)
POTASSIUM SERPL-SCNC: 3.8 MMOL/L (ref 3.5–5.2)
PROT SERPL-MCNC: 8.1 G/DL (ref 6–8.5)
SODIUM SERPL-SCNC: 138 MMOL/L (ref 136–145)

## 2024-05-22 LAB
HCV RNA SERPL NAA+PROBE-ACNC: <15 IU/ML
TEST INFORMATION: NORMAL

## 2024-07-22 ENCOUNTER — LAB (OUTPATIENT)
Dept: LAB | Facility: HOSPITAL | Age: 44
End: 2024-07-22
Payer: COMMERCIAL

## 2024-07-22 ENCOUNTER — TRANSCRIBE ORDERS (OUTPATIENT)
Dept: LAB | Facility: HOSPITAL | Age: 44
End: 2024-07-22
Payer: COMMERCIAL

## 2024-07-22 DIAGNOSIS — B18.2 CHRONIC HEPATITIS C WITH HEPATIC COMA: ICD-10-CM

## 2024-07-22 DIAGNOSIS — B18.2 CHRONIC HEPATITIS C WITH HEPATIC COMA: Primary | ICD-10-CM

## 2024-07-22 LAB
ALBUMIN SERPL-MCNC: 4.5 G/DL (ref 3.5–5.2)
ALBUMIN/GLOB SERPL: 1.3 G/DL
ALP SERPL-CCNC: 54 U/L (ref 39–117)
ALT SERPL W P-5'-P-CCNC: 22 U/L (ref 1–41)
ANION GAP SERPL CALCULATED.3IONS-SCNC: 9.4 MMOL/L (ref 5–15)
AST SERPL-CCNC: 28 U/L (ref 1–40)
BILIRUB SERPL-MCNC: 0.6 MG/DL (ref 0–1.2)
BUN SERPL-MCNC: 13 MG/DL (ref 6–20)
BUN/CREAT SERPL: 11.2 (ref 7–25)
CALCIUM SPEC-SCNC: 9.7 MG/DL (ref 8.6–10.5)
CHLORIDE SERPL-SCNC: 105 MMOL/L (ref 98–107)
CO2 SERPL-SCNC: 23.6 MMOL/L (ref 22–29)
CREAT SERPL-MCNC: 1.16 MG/DL (ref 0.76–1.27)
EGFRCR SERPLBLD CKD-EPI 2021: 79.6 ML/MIN/1.73
GLOBULIN UR ELPH-MCNC: 3.4 GM/DL
GLUCOSE SERPL-MCNC: 105 MG/DL (ref 65–99)
INR PPP: 0.95 (ref 0.9–1.1)
POTASSIUM SERPL-SCNC: 3.7 MMOL/L (ref 3.5–5.2)
PROT SERPL-MCNC: 7.9 G/DL (ref 6–8.5)
PROTHROMBIN TIME: 13.2 SECONDS (ref 12.3–15.1)
SODIUM SERPL-SCNC: 138 MMOL/L (ref 136–145)

## 2024-07-22 PROCEDURE — 86803 HEPATITIS C AB TEST: CPT

## 2024-07-22 PROCEDURE — 85007 BL SMEAR W/DIFF WBC COUNT: CPT

## 2024-07-22 PROCEDURE — 36415 COLL VENOUS BLD VENIPUNCTURE: CPT

## 2024-07-22 PROCEDURE — 87522 HEPATITIS C REVRS TRNSCRPJ: CPT

## 2024-07-22 PROCEDURE — 80053 COMPREHEN METABOLIC PANEL: CPT

## 2024-07-22 PROCEDURE — 85025 COMPLETE CBC W/AUTO DIFF WBC: CPT

## 2024-07-22 PROCEDURE — 85610 PROTHROMBIN TIME: CPT

## 2024-07-23 LAB
BASOPHILS # BLD MANUAL: 0.04 10*3/MM3 (ref 0–0.2)
BASOPHILS NFR BLD MANUAL: 1 % (ref 0–1.5)
DEPRECATED RDW RBC AUTO: 44.3 FL (ref 37–54)
EOSINOPHIL # BLD MANUAL: 0.04 10*3/MM3 (ref 0–0.4)
EOSINOPHIL NFR BLD MANUAL: 1 % (ref 0.3–6.2)
ERYTHROCYTE [DISTWIDTH] IN BLOOD BY AUTOMATED COUNT: 13.1 % (ref 12.3–15.4)
HCT VFR BLD AUTO: 43.9 % (ref 37.5–51)
HGB BLD-MCNC: 14 G/DL (ref 13–17.7)
LYMPHOCYTES # BLD MANUAL: 0.98 10*3/MM3 (ref 0.7–3.1)
LYMPHOCYTES NFR BLD MANUAL: 2.1 % (ref 5–12)
MCH RBC QN AUTO: 29.3 PG (ref 26.6–33)
MCHC RBC AUTO-ENTMCNC: 31.9 G/DL (ref 31.5–35.7)
MCV RBC AUTO: 91.8 FL (ref 79–97)
MONOCYTES # BLD: 0.08 10*3/MM3 (ref 0.1–0.9)
MYELOCYTES NFR BLD MANUAL: 1 % (ref 0–0)
NEUTROPHILS # BLD AUTO: 2.61 10*3/MM3 (ref 1.7–7)
NEUTROPHILS NFR BLD MANUAL: 69.1 % (ref 42.7–76)
PLAT MORPH BLD: NORMAL
PLATELET # BLD AUTO: 96 10*3/MM3 (ref 140–450)
PMV BLD AUTO: 12.8 FL (ref 6–12)
POIKILOCYTOSIS BLD QL SMEAR: ABNORMAL
RBC # BLD AUTO: 4.78 10*6/MM3 (ref 4.14–5.8)
VARIANT LYMPHS NFR BLD MANUAL: 25.8 % (ref 19.6–45.3)
WBC MORPH BLD: NORMAL
WBC NRBC COR # BLD AUTO: 3.78 10*3/MM3 (ref 3.4–10.8)

## 2024-07-26 LAB
DIAGNOSTIC IMP SPEC-IMP: NORMAL
HCV IGG SERPL QL IA: REACTIVE
HCV RNA SERPL NAA+PROBE-ACNC: NORMAL IU/ML
REF LAB TEST REF RANGE: NORMAL

## 2024-08-26 ENCOUNTER — HOSPITAL ENCOUNTER (EMERGENCY)
Facility: HOSPITAL | Age: 44
Discharge: HOME | End: 2024-08-26
Attending: STUDENT IN AN ORGANIZED HEALTH CARE EDUCATION/TRAINING PROGRAM | Admitting: STUDENT IN AN ORGANIZED HEALTH CARE EDUCATION/TRAINING PROGRAM
Payer: COMMERCIAL

## 2024-08-26 VITALS
HEART RATE: 82 BPM | TEMPERATURE: 98.3 F | HEIGHT: 72 IN | BODY MASS INDEX: 30.61 KG/M2 | WEIGHT: 226 LBS | DIASTOLIC BLOOD PRESSURE: 89 MMHG | SYSTOLIC BLOOD PRESSURE: 131 MMHG | OXYGEN SATURATION: 96 % | RESPIRATION RATE: 18 BRPM

## 2024-08-26 DIAGNOSIS — L08.9: Primary | ICD-10-CM

## 2024-08-26 DIAGNOSIS — S20.362A: Primary | ICD-10-CM

## 2024-08-26 PROCEDURE — 99283 EMERGENCY DEPT VISIT LOW MDM: CPT

## 2024-08-26 RX ORDER — DOXYCYCLINE 100 MG/1
100 CAPSULE ORAL ONCE
Status: COMPLETED | OUTPATIENT
Start: 2024-08-26 | End: 2024-08-26

## 2024-08-26 RX ORDER — DOXYCYCLINE 100 MG/1
100 CAPSULE ORAL 2 TIMES DAILY
Qty: 14 CAPSULE | Refills: 0 | Status: SHIPPED | OUTPATIENT
Start: 2024-08-26 | End: 2024-09-02

## 2024-08-26 RX ADMIN — DOXYCYCLINE 100 MG: 100 CAPSULE ORAL at 21:36

## 2024-08-27 NOTE — DISCHARGE INSTRUCTIONS
Continue antibiotics as prescribed twice daily.  Follow-up closely with primary care provider.  May also use warm compresses on skin daily to help drainage of any small fluid collection if one develops.

## 2024-08-27 NOTE — ED PROVIDER NOTES
" EMERGENCY DEPARTMENT ENCOUNTER    Pt Name: Nilson Miller  MRN: [id number]  Pt :   1980  Room Number:    Date of encounter:  2024  PCP: Kylah Saenz APRN  ED Provider: Luke Woods MD    Historian: Patient      HPI:  Chief Complaint: Insect bite        Context: Nilson Miller is a 44 y.o. male who presents to the ED c/o insect bite.  Patient states that he was in the woods a few days ago and got bit by multiple bugs.  States that he noticed swelling and redness under her left breast.  Denies any drainage from the wound.  Has never had abscess or MRSA infection before.  No other symptoms reported at's time.      PAST MEDICAL HISTORY  History reviewed. No pertinent past medical history.      PAST SURGICAL HISTORY  Past Surgical History:   Procedure Laterality Date    KNEE RECONSTRUCTION, MEDIAL PATELLAR FEMORAL LIGAMENT           FAMILY HISTORY  History reviewed. No pertinent family history.      SOCIAL HISTORY  Social History     Socioeconomic History    Marital status: Legally    Tobacco Use    Smoking status: Every Day     Current packs/day: 0.25     Types: Cigarettes     Passive exposure: Current    Smokeless tobacco: Never   Vaping Use    Vaping status: Never Used   Substance and Sexual Activity    Alcohol use: Not Currently     Comment: \"occasionally\"    Drug use: Yes     Types: Methamphetamines, Marijuana    Sexual activity: Defer         ALLERGIES  Penicillins and Sulfa antibiotics        REVIEW OF SYSTEMS  Review of Systems     All systems reviewed and negative except for those discussed in HPI.       PHYSICAL EXAM    I have reviewed the triage vital signs and nursing notes.    ED Triage Vitals [24]   Temp Heart Rate Resp BP SpO2   98.3 °F (36.8 °C) 82 18 131/89 96 %      Temp src Heart Rate Source Patient Position BP Location FiO2 (%)   Oral Monitor Sitting Left arm --       Physical Exam    General:  Awake, alert, no acute distress  HEENT: Atraumatic, " normocephalic, EOMI, PERRLA, mucous membranes moist  NECK:  Supple, atraumatic  Cardiovascular:  Regular rate, regular rhythm, no murmurs, rubs, or gallops.  Extremities well perfused   Respiratory:  Regular rate, clear lungs to auscultation bilaterally.  No rhonchi, rales, wheezing  Abdominal:  Soft, nondistended, nontender.  No guarding or rebound.  No palpable masses  Extremity:  No visible bony abnormalities in all 4 extremities.  Full range of motion of all extremities.  Skin:  Warm and dry.  Raised erythematous region on left chest under breast with punctate puncture wound in center  Neuro:  AAOx3, GCS 15. Cranial nerves 2-12 grossly intact.  No focal strength or sensation deficits.  Psych:  Mood and affect appropriate.        LAB RESULTS  No results found for this or any previous visit (from the past 24 hour(s)).          RADIOLOGY  No Radiology Exams Resulted Within Past 24 Hours        PROCEDURES    Procedures    No orders to display       MEDICATIONS GIVEN IN ER    Medications   doxycycline (MONODOX) capsule 100 mg (has no administration in time range)         MEDICAL DECISION MAKING, PROGRESS, and CONSULTS    All labs, if obtained, have been independently reviewed by me.  All radiology studies, if obtained, have been reviewed by me and the radiologist dictating the report.  All EKG's, if obtained, have been independently viewed and interpreted by me.      Discussion below represents my analysis of pertinent findings related to patient's condition, differential diagnosis, treatment plan and final disposition.    Nilson Miller is a 44 y.o. male who presents to the ED c/o insect bite.  Patient has what appears to be cellulitic insect bite wound on left chest.  Bedside ultrasound performed and showed no evidence of drainable fluid collection/abscess.  Will empirically treat with doxycycline.  Advised on follow-up with primary care provider and on return precautions.  Patient agreeable with this plan was  discharged.                                 Orders placed during this visit:  No orders of the defined types were placed in this encounter.        ED Course:    Consultants:                  Shared Decision Making:  After my consideration of clinical presentation and any laboratory/radiology studies obtained, I discussed the findings with the patient/patient representative who is in agreement with the treatment plan and the final disposition.   Risks and benefits of discharge and/or observation/admission were discussed.      AS OF 21:32 EDT VITALS:    BP - 131/89  HR - 82  TEMP - 98.3 °F (36.8 °C) (Oral)  O2 SATS - 96%                  DIAGNOSIS  Final diagnoses:   Infected insect bite of chest, left, initial encounter         DISPOSITION  Discharge      Please note that portions of this document were completed with voice recognition software.        Luke Woods MD  08/27/24 [id number]